# Patient Record
Sex: MALE | Race: WHITE | NOT HISPANIC OR LATINO | Employment: OTHER | URBAN - METROPOLITAN AREA
[De-identification: names, ages, dates, MRNs, and addresses within clinical notes are randomized per-mention and may not be internally consistent; named-entity substitution may affect disease eponyms.]

---

## 2019-05-09 ENCOUNTER — DOCUMENTATION (OUTPATIENT)
Dept: CARDIOLOGY | Facility: CLINIC | Age: 64
End: 2019-05-09

## 2019-05-09 ENCOUNTER — TELEPHONE (OUTPATIENT)
Dept: CARDIOLOGY | Facility: CLINIC | Age: 64
End: 2019-05-09

## 2019-05-09 DIAGNOSIS — Z95.2 HX OF AORTIC VALVE REPLACEMENT, MECHANICAL: ICD-10-CM

## 2019-05-09 DIAGNOSIS — Z79.01 LONG TERM (CURRENT) USE OF ANTICOAGULANTS: ICD-10-CM

## 2019-05-09 LAB — INR PPP: 2.9 (ref 2.5–3.5)

## 2019-05-10 PROBLEM — Z79.01 LONG TERM (CURRENT) USE OF ANTICOAGULANTS: Status: ACTIVE | Noted: 2019-05-10

## 2019-05-10 PROBLEM — Z95.2 HX OF AORTIC VALVE REPLACEMENT, MECHANICAL: Status: ACTIVE | Noted: 2019-05-10

## 2019-06-05 ENCOUNTER — OFFICE VISIT (OUTPATIENT)
Dept: CARDIOLOGY | Facility: CLINIC | Age: 64
End: 2019-06-05

## 2019-06-05 ENCOUNTER — ANTICOAGULATION VISIT (OUTPATIENT)
Dept: CARDIOLOGY | Facility: CLINIC | Age: 64
End: 2019-06-05

## 2019-06-05 VITALS
SYSTOLIC BLOOD PRESSURE: 104 MMHG | HEIGHT: 69 IN | HEART RATE: 63 BPM | DIASTOLIC BLOOD PRESSURE: 64 MMHG | BODY MASS INDEX: 34.36 KG/M2 | WEIGHT: 232 LBS

## 2019-06-05 DIAGNOSIS — Z79.01 LONG TERM (CURRENT) USE OF ANTICOAGULANTS: ICD-10-CM

## 2019-06-05 DIAGNOSIS — Z95.2 HX OF AORTIC VALVE REPLACEMENT, MECHANICAL: Primary | ICD-10-CM

## 2019-06-05 DIAGNOSIS — I10 ESSENTIAL HYPERTENSION: ICD-10-CM

## 2019-06-05 DIAGNOSIS — E78.5 DYSLIPIDEMIA: ICD-10-CM

## 2019-06-05 DIAGNOSIS — Z95.2 HX OF AORTIC VALVE REPLACEMENT, MECHANICAL: ICD-10-CM

## 2019-06-05 DIAGNOSIS — Z95.828 HX OF ASCENDING AORTA REPLACEMENT: ICD-10-CM

## 2019-06-05 PROBLEM — Z87.74 HISTORY OF BICUSPID AORTIC VALVE: Status: ACTIVE | Noted: 2019-06-05

## 2019-06-05 PROBLEM — E11.9 DM (DIABETES MELLITUS), TYPE 2 (HCC): Status: ACTIVE | Noted: 2019-06-05

## 2019-06-05 LAB — INR PPP: 3 (ref 2.5–3.5)

## 2019-06-05 PROCEDURE — 85610 PROTHROMBIN TIME: CPT | Performed by: INTERNAL MEDICINE

## 2019-06-05 PROCEDURE — 99214 OFFICE O/P EST MOD 30 MIN: CPT | Performed by: INTERNAL MEDICINE

## 2019-06-05 PROCEDURE — 36416 COLLJ CAPILLARY BLOOD SPEC: CPT | Performed by: INTERNAL MEDICINE

## 2019-06-05 RX ORDER — IRBESARTAN 75 MG/1
75 TABLET ORAL NIGHTLY
Qty: 90 TABLET | Refills: 2 | Status: SHIPPED | OUTPATIENT
Start: 2019-06-05 | End: 2020-02-10 | Stop reason: SDUPTHER

## 2019-06-05 RX ORDER — IRBESARTAN 75 MG/1
75 TABLET ORAL NIGHTLY
COMMUNITY
End: 2019-06-05 | Stop reason: SDUPTHER

## 2019-06-05 RX ORDER — METOPROLOL SUCCINATE 25 MG/1
25 TABLET, EXTENDED RELEASE ORAL DAILY
Qty: 90 TABLET | Refills: 2 | Status: SHIPPED | OUTPATIENT
Start: 2019-06-05 | End: 2020-02-10 | Stop reason: SDUPTHER

## 2019-06-05 RX ORDER — METOPROLOL SUCCINATE 25 MG/1
25 TABLET, EXTENDED RELEASE ORAL DAILY
COMMUNITY
End: 2019-06-05 | Stop reason: SDUPTHER

## 2019-06-05 NOTE — PROGRESS NOTES
[facility]        Subjective:     Encounter Date:06/05/2019      Patient ID: Bravo Jorge is a 64 y.o. male.    Chief Complaint:  Chief Complaint   Patient presents with   • Cardiac Valve Problem       HPI:  I the pleasure seeing Mr. Marie in the office today.  He is a very pleasant 64-year-old gentleman with a prior history of a bicuspid aortic valve.  He underwent aortic valve surgery and 1995.  He had a [employer] mechanical valve.  He had preop surgery in December 2009 with replacing of the ascending aorta and aortic valve.  He also has a history of hypertension as well as a right bundle branch block and left anterior fascicular block.  He has a history of dyslipidemia and diabetes mellitus.  He had a CT scan in 2016 which demonstrated no evidence of ascending aneurysm.  Bravo is not complain of chest discomfort or palpitations.  He denies shortness of air, orthopnea or paroxysmal nocturnal dyspnea.  He has no lower extremity edema.  Plan he states he did have a recent hemoglobin A1c obtained which was 9.  His triglycerides were also elevated.  He has begun more strict diet control as well as exercise.  His hemoglobin A1c is going to be rechecked in approximately 3 months.    The following portions of the patient's history were reviewed and updated as appropriate: allergies, current medications, past family history, past medical history, past social history, past surgical history and problem list.    Problem List:  Patient Active Problem List   Diagnosis   • Hx of aortic valve replacement, mechanical [Z95.2]   • Long term (current) use of anticoagulants [Z79.01]   • Hx of ascending aorta replacement   • History of bicuspid aortic valve   • Essential hypertension   • Dyslipidemia   • DM (diabetes mellitus), type 2 (CMS/HCC)       Past Medical History:  Past Medical History:   Diagnosis Date   • Diabetes mellitus (CMS/HCC)    • Hyperlipidemia    • Hypertension    • RBBB        Past Surgical  History:  Past Surgical History:   Procedure Laterality Date   • AORTIC ROOT REPLACEMENT     • AORTIC VALVE REPAIR/REPLACEMENT         Social History:  Social History     Socioeconomic History   • Marital status:      Spouse name: Not on file   • Number of children: Not on file   • Years of education: Not on file   • Highest education level: Not on file   Tobacco Use   • Smoking status: Never Smoker   Substance and Sexual Activity   • Alcohol use: Yes     Frequency: 2-4 times a month   • Drug use: Defer   • Sexual activity: Defer       Allergies:  No Known Allergies      ROS:  Review of Systems   Constitution: Negative for chills, decreased appetite, fever, malaise/fatigue, weight gain and weight loss.   HENT: Negative for congestion, hoarse voice, nosebleeds and sore throat.    Eyes: Negative for blurred vision, double vision and visual disturbance.   Cardiovascular: Negative for chest pain, claudication, dyspnea on exertion, irregular heartbeat, leg swelling, near-syncope, orthopnea, palpitations, paroxysmal nocturnal dyspnea and syncope.   Respiratory: Negative for cough, hemoptysis, shortness of breath, sleep disturbances due to breathing, snoring, sputum production and wheezing.    Endocrine: Negative for cold intolerance, heat intolerance, polydipsia and polyuria.   Hematologic/Lymphatic: Negative for adenopathy and bleeding problem. Does not bruise/bleed easily.   Skin: Negative for flushing, itching, nail changes and rash.   Musculoskeletal: Positive for arthritis. Negative for back pain, joint pain, muscle cramps, muscle weakness, myalgias and neck pain.   Gastrointestinal: Positive for heartburn. Negative for bloating, abdominal pain, anorexia, change in bowel habit, constipation, diarrhea, hematemesis, hematochezia, jaundice, melena, nausea and vomiting.   Genitourinary: Negative for dysuria, hematuria and nocturia.   Neurological: Positive for dizziness ( Occasionally when standing). Negative for  "brief paralysis, disturbances in coordination, excessive daytime sleepiness, headaches, light-headedness, loss of balance, numbness, paresthesias, seizures and vertigo.   Psychiatric/Behavioral: Negative for altered mental status and depression. The patient is not nervous/anxious.    Allergic/Immunologic: Negative for environmental allergies and hives.          Objective:         /64   Pulse 63   Ht 174 cm (68.5\")   Wt 105 kg (232 lb)   BMI 34.76 kg/m²     Physical Exam   Constitutional: He is oriented to person, place, and time. He appears well-developed and well-nourished. No distress.   HENT:   Head: Normocephalic and atraumatic.   Mouth/Throat: Oropharynx is clear and moist.   Eyes: Conjunctivae and EOM are normal. Pupils are equal, round, and reactive to light. No scleral icterus.   Neck: Normal range of motion. Neck supple. No thyromegaly present.   Cardiovascular: Normal rate, regular rhythm, S1 normal, S2 normal and intact distal pulses.  No extrasystoles are present. PMI is not displaced. Exam reveals no gallop, no S3, no S4, no friction rub and no decreased pulses.   Murmur ( Crisp mechanical aortic valve sounds.  There is a 2/6 systolic murmur heard at the upper right sternal border and heard throughout the precordium.) heard.  Pulses:       Carotid pulses are 2+ on the right side, and 2+ on the left side.       Dorsalis pedis pulses are 2+ on the right side, and 2+ on the left side.        Posterior tibial pulses are 2+ on the right side, and 2+ on the left side.   Pulmonary/Chest: Effort normal and breath sounds normal. No respiratory distress. He has no wheezes. He has no rales.   Healed sternal scar   Abdominal: Soft. Bowel sounds are normal. He exhibits no distension and no mass. There is no tenderness. There is no rebound and no guarding.   Musculoskeletal: Normal range of motion. He exhibits no edema.   Lymphadenopathy:     He has no cervical adenopathy.   Neurological: He is alert and " oriented to person, place, and time. Coordination normal.   Skin: Skin is warm and dry. No rash noted. He is not diaphoretic. No pallor.   Psychiatric: He has a normal mood and affect. His behavior is normal.   Nursing note and vitals reviewed.      In-Office Procedure(s):  Procedures    ASCVD RIsk Score::  The ASCVD Risk score (L[place]rain SCHWAB Jr., et al., 2013) failed to calculate for the following reasons:    Cannot find a previous HDL lab    Cannot find a previous total cholesterol lab    Recent Radiology:  Imaging Results (most recent)     None          Lab Review:   Documentation on 05/09/2019   Component Date Value   • INR 05/09/2019 2.90                       Assessment:         Problems Addressed this Visit        Cardiovascular and Mediastinum    Essential hypertension    Relevant Medications    irbesartan (AVAPRO) 75 MG tablet    metoprolol succinate XL (TOPROL-XL) 25 MG 24 hr tablet       Other    Hx of aortic valve replacement, mechanical [Z95.2] - Primary    Hx of ascending aorta replacement    Dyslipidemia             Plan:     Bravo is doing well.  I am going to refill his metoprolol and irbesartan.  His INR in the office today is 3.0.  He will need to have a CT scan of his chest repeated for surveillance of his ascending aorta.  He will need to have an echocardiogram on his next visit.               Jelly Florence MD  06/05/19  .

## 2019-07-08 ENCOUNTER — ANTICOAGULATION VISIT (OUTPATIENT)
Dept: CARDIOLOGY | Facility: CLINIC | Age: 64
End: 2019-07-08

## 2019-07-08 DIAGNOSIS — Z95.2 HX OF AORTIC VALVE REPLACEMENT, MECHANICAL: ICD-10-CM

## 2019-07-08 DIAGNOSIS — Z79.01 LONG TERM (CURRENT) USE OF ANTICOAGULANTS: ICD-10-CM

## 2019-07-08 LAB — INR PPP: 2.6 (ref 2.5–3.5)

## 2019-07-08 PROCEDURE — 85610 PROTHROMBIN TIME: CPT | Performed by: INTERNAL MEDICINE

## 2019-07-08 PROCEDURE — 36416 COLLJ CAPILLARY BLOOD SPEC: CPT | Performed by: INTERNAL MEDICINE

## 2019-07-10 ENCOUNTER — TELEPHONE (OUTPATIENT)
Dept: CARDIOLOGY | Facility: CLINIC | Age: 64
End: 2019-07-10

## 2019-07-24 ENCOUNTER — TELEPHONE (OUTPATIENT)
Dept: CARDIOLOGY | Facility: CLINIC | Age: 64
End: 2019-07-24

## 2019-07-24 RX ORDER — WARFARIN SODIUM 5 MG/1
5 TABLET ORAL
Qty: 30 TABLET | Refills: 1 | Status: SHIPPED | OUTPATIENT
Start: 2019-07-24 | End: 2019-09-13 | Stop reason: SDUPTHER

## 2019-07-24 RX ORDER — FENOFIBRATE 160 MG/1
160 TABLET ORAL DAILY
Qty: 90 TABLET | Refills: 3 | Status: SHIPPED | OUTPATIENT
Start: 2019-07-24 | End: 2020-03-26 | Stop reason: SDUPTHER

## 2019-07-24 NOTE — TELEPHONE ENCOUNTER
PT meds in on hold. Fenofibrate at [facility]. DR FIELDS [phone].   Prescription # [id number] Order # [id number]    Warfarin needs 60 day 5mg to [facility]. Waiting for mail order but it will be a couple weeks.

## 2019-08-09 ENCOUNTER — ANTICOAGULATION VISIT (OUTPATIENT)
Dept: CARDIOLOGY | Facility: CLINIC | Age: 64
End: 2019-08-09

## 2019-08-09 DIAGNOSIS — Z79.01 LONG TERM (CURRENT) USE OF ANTICOAGULANTS: ICD-10-CM

## 2019-08-09 DIAGNOSIS — Z95.2 HX OF AORTIC VALVE REPLACEMENT, MECHANICAL: ICD-10-CM

## 2019-08-09 LAB — INR PPP: 2 (ref 2.5–3.5)

## 2019-08-09 PROCEDURE — 36416 COLLJ CAPILLARY BLOOD SPEC: CPT | Performed by: INTERNAL MEDICINE

## 2019-08-09 PROCEDURE — 85610 PROTHROMBIN TIME: CPT | Performed by: INTERNAL MEDICINE

## 2019-08-16 ENCOUNTER — ANTICOAGULATION VISIT (OUTPATIENT)
Dept: CARDIOLOGY | Facility: CLINIC | Age: 64
End: 2019-08-16

## 2019-08-16 DIAGNOSIS — Z79.01 LONG TERM (CURRENT) USE OF ANTICOAGULANTS: ICD-10-CM

## 2019-08-16 DIAGNOSIS — Z95.2 HX OF AORTIC VALVE REPLACEMENT, MECHANICAL: ICD-10-CM

## 2019-08-16 LAB — INR PPP: 2.2 (ref 2.5–3.5)

## 2019-08-16 PROCEDURE — 36416 COLLJ CAPILLARY BLOOD SPEC: CPT | Performed by: INTERNAL MEDICINE

## 2019-08-16 PROCEDURE — 85610 PROTHROMBIN TIME: CPT | Performed by: INTERNAL MEDICINE

## 2019-08-23 ENCOUNTER — ANTICOAGULATION VISIT (OUTPATIENT)
Dept: CARDIOLOGY | Facility: CLINIC | Age: 64
End: 2019-08-23

## 2019-08-23 DIAGNOSIS — Z95.2 HX OF AORTIC VALVE REPLACEMENT, MECHANICAL: ICD-10-CM

## 2019-08-23 DIAGNOSIS — Z79.01 LONG TERM (CURRENT) USE OF ANTICOAGULANTS: ICD-10-CM

## 2019-08-23 LAB — INR PPP: 2.8 (ref 2.5–3.5)

## 2019-08-23 PROCEDURE — 36416 COLLJ CAPILLARY BLOOD SPEC: CPT | Performed by: INTERNAL MEDICINE

## 2019-08-23 PROCEDURE — 85610 PROTHROMBIN TIME: CPT | Performed by: INTERNAL MEDICINE

## 2019-09-13 ENCOUNTER — ANTICOAGULATION VISIT (OUTPATIENT)
Dept: CARDIOLOGY | Facility: CLINIC | Age: 64
End: 2019-09-13

## 2019-09-13 DIAGNOSIS — Z95.2 HX OF AORTIC VALVE REPLACEMENT, MECHANICAL: ICD-10-CM

## 2019-09-13 DIAGNOSIS — Z79.01 LONG TERM (CURRENT) USE OF ANTICOAGULANTS: ICD-10-CM

## 2019-09-13 LAB — INR PPP: 4 (ref 2.5–3.5)

## 2019-09-13 RX ORDER — WARFARIN SODIUM 5 MG/1
5-7.5 TABLET ORAL
Qty: 135 TABLET | Refills: 0 | Status: SHIPPED | OUTPATIENT
Start: 2019-09-13 | End: 2019-12-04 | Stop reason: SDUPTHER

## 2019-09-16 ENCOUNTER — ANTICOAGULATION VISIT (OUTPATIENT)
Dept: CARDIOLOGY | Facility: CLINIC | Age: 64
End: 2019-09-16

## 2019-09-16 DIAGNOSIS — Z95.2 HX OF AORTIC VALVE REPLACEMENT, MECHANICAL: ICD-10-CM

## 2019-09-16 DIAGNOSIS — Z79.01 LONG TERM (CURRENT) USE OF ANTICOAGULANTS: ICD-10-CM

## 2019-09-16 LAB — INR PPP: 2.5 (ref 2.5–3.5)

## 2019-09-16 PROCEDURE — 36416 COLLJ CAPILLARY BLOOD SPEC: CPT | Performed by: INTERNAL MEDICINE

## 2019-09-16 PROCEDURE — 85610 PROTHROMBIN TIME: CPT | Performed by: INTERNAL MEDICINE

## 2019-09-23 LAB — INR PPP: 3.6 (ref 2.5–3.5)

## 2019-09-25 ENCOUNTER — ANTICOAGULATION VISIT (OUTPATIENT)
Dept: CARDIOLOGY | Facility: CLINIC | Age: 64
End: 2019-09-25

## 2019-09-25 DIAGNOSIS — Z79.01 LONG TERM (CURRENT) USE OF ANTICOAGULANTS: ICD-10-CM

## 2019-09-25 DIAGNOSIS — Z95.2 HX OF AORTIC VALVE REPLACEMENT, MECHANICAL: ICD-10-CM

## 2019-09-25 PROCEDURE — 85610 PROTHROMBIN TIME: CPT | Performed by: INTERNAL MEDICINE

## 2019-09-25 PROCEDURE — 36416 COLLJ CAPILLARY BLOOD SPEC: CPT | Performed by: INTERNAL MEDICINE

## 2019-10-07 ENCOUNTER — ANTICOAGULATION VISIT (OUTPATIENT)
Dept: CARDIOLOGY | Facility: CLINIC | Age: 64
End: 2019-10-07

## 2019-10-07 DIAGNOSIS — Z79.01 LONG TERM (CURRENT) USE OF ANTICOAGULANTS: ICD-10-CM

## 2019-10-07 DIAGNOSIS — Z95.2 HX OF AORTIC VALVE REPLACEMENT, MECHANICAL: ICD-10-CM

## 2019-10-07 LAB — INR PPP: 3.1 (ref 2.5–3.5)

## 2019-10-07 PROCEDURE — 36416 COLLJ CAPILLARY BLOOD SPEC: CPT | Performed by: INTERNAL MEDICINE

## 2019-10-07 PROCEDURE — 85610 PROTHROMBIN TIME: CPT | Performed by: INTERNAL MEDICINE

## 2019-11-06 ENCOUNTER — ANTICOAGULATION VISIT (OUTPATIENT)
Dept: CARDIOLOGY | Facility: CLINIC | Age: 64
End: 2019-11-06

## 2019-11-06 DIAGNOSIS — Z95.2 HX OF AORTIC VALVE REPLACEMENT, MECHANICAL: ICD-10-CM

## 2019-11-06 DIAGNOSIS — Z79.01 LONG TERM (CURRENT) USE OF ANTICOAGULANTS: ICD-10-CM

## 2019-11-06 LAB — INR PPP: 2.6 (ref 2.5–3.5)

## 2019-11-06 PROCEDURE — 85610 PROTHROMBIN TIME: CPT | Performed by: INTERNAL MEDICINE

## 2019-11-06 PROCEDURE — 36416 COLLJ CAPILLARY BLOOD SPEC: CPT | Performed by: INTERNAL MEDICINE

## 2019-12-04 RX ORDER — WARFARIN SODIUM 5 MG/1
2.5-5 TABLET ORAL DAILY
Qty: 90 TABLET | Refills: 0 | Status: SHIPPED | OUTPATIENT
Start: 2019-12-04 | End: 2020-03-16

## 2019-12-09 ENCOUNTER — CLINICAL SUPPORT (OUTPATIENT)
Dept: CARDIOLOGY | Facility: CLINIC | Age: 64
End: 2019-12-09

## 2019-12-09 DIAGNOSIS — Z95.2 HX OF AORTIC VALVE REPLACEMENT, MECHANICAL: ICD-10-CM

## 2019-12-09 DIAGNOSIS — Z79.01 LONG TERM (CURRENT) USE OF ANTICOAGULANTS: ICD-10-CM

## 2019-12-09 LAB — INR PPP: 2.9

## 2019-12-09 PROCEDURE — 85610 PROTHROMBIN TIME: CPT | Performed by: INTERNAL MEDICINE

## 2019-12-09 PROCEDURE — 36416 COLLJ CAPILLARY BLOOD SPEC: CPT | Performed by: INTERNAL MEDICINE

## 2020-01-03 ENCOUNTER — CLINICAL SUPPORT (OUTPATIENT)
Dept: CARDIOLOGY | Facility: CLINIC | Age: 65
End: 2020-01-03

## 2020-01-03 DIAGNOSIS — Z79.01 LONG TERM (CURRENT) USE OF ANTICOAGULANTS: ICD-10-CM

## 2020-01-03 DIAGNOSIS — Z95.2 HX OF AORTIC VALVE REPLACEMENT, MECHANICAL: ICD-10-CM

## 2020-01-03 LAB — INR PPP: 2.6 (ref 2.5–3.5)

## 2020-01-03 PROCEDURE — 36416 COLLJ CAPILLARY BLOOD SPEC: CPT | Performed by: INTERNAL MEDICINE

## 2020-01-03 PROCEDURE — 85610 PROTHROMBIN TIME: CPT | Performed by: INTERNAL MEDICINE

## 2020-01-16 ENCOUNTER — OFFICE VISIT (OUTPATIENT)
Dept: CARDIOLOGY | Facility: CLINIC | Age: 65
End: 2020-01-16

## 2020-01-16 VITALS
DIASTOLIC BLOOD PRESSURE: 78 MMHG | SYSTOLIC BLOOD PRESSURE: 130 MMHG | WEIGHT: 232 LBS | HEIGHT: 69 IN | HEART RATE: 62 BPM | BODY MASS INDEX: 34.36 KG/M2

## 2020-01-16 DIAGNOSIS — Z87.74 HISTORY OF BICUSPID AORTIC VALVE: ICD-10-CM

## 2020-01-16 DIAGNOSIS — R06.83 SNORING: ICD-10-CM

## 2020-01-16 DIAGNOSIS — I10 ESSENTIAL HYPERTENSION: ICD-10-CM

## 2020-01-16 DIAGNOSIS — Z95.2 HX OF AORTIC VALVE REPLACEMENT, MECHANICAL: Primary | ICD-10-CM

## 2020-01-16 DIAGNOSIS — Z95.828 HX OF ASCENDING AORTA REPLACEMENT: ICD-10-CM

## 2020-01-16 PROCEDURE — 99213 OFFICE O/P EST LOW 20 MIN: CPT | Performed by: INTERNAL MEDICINE

## 2020-01-16 NOTE — PROGRESS NOTES
Subjective:     Encounter Date:01/16/2020      Patient ID: Bravo Jorge is a 64 y.o. male.    Chief Complaint:  Chief Complaint   Patient presents with   • Cardiac Valve Problem       HPI:  History of Present Illness  I had the pleasure of seeing Bravo in the office today.  He is a very pleasant 64-year-old gentleman with a history of a bicuspid aortic valve.  He underwent aortic valve surgery in 1995 and had a [employer] mechanical valve placed.  In December 2009 he had replacement of the ascending aorta and aortic valve.  Bravo also has a history of hypertension and a right bundle branch block with left anterior fascicular block.  He has a history of dyslipidemia and diabetes mellitus.    Bravo is in the office today for continued care regarding his valvular heart disease.  His initial blood pressure was mildly elevated.  I did recheck it and it had decreased to 130/78 (which is still mildly elevated).  He states he does monitor his blood pressure at home and it is generally in the 120 systolic range.  Bravo is not complaining of chest discomfort or shortness of air.  He denies palpitations, dizziness or near syncope..  He denies lower extremity edema, orthopnea or paroxysmal nocturnal dyspnea.    The following portions of the patient's history were reviewed and updated as appropriate: allergies, current medications, past family history, past medical history, past social history, past surgical history and problem list.    Problem List:  Patient Active Problem List   Diagnosis   • Hx of aortic valve replacement, mechanical [Z95.2]   • Long term (current) use of anticoagulants [Z79.01]   • Hx of ascending aorta replacement   • History of bicuspid aortic valve   • Essential hypertension   • Dyslipidemia   • DM (diabetes mellitus), type 2 (CMS/HCC)   • History of echocardiogram   • Snoring       Past Medical History:  Past Medical History:   Diagnosis Date   • Diabetes mellitus (CMS/HCC)    • History of  echocardiogram     10/4/18 - EF 50-55%. Moderate concentric LVH. Moderately dilated LA. Mild AR & TR. Mild MR, MV is mildly thickened.   • Hyperlipidemia    • Hypertension    • RBBB        Past Surgical History:  Past Surgical History:   Procedure Laterality Date   • AORTIC ROOT REPLACEMENT     • AORTIC VALVE REPAIR/REPLACEMENT         Social History:  Social History     Socioeconomic History   • Marital status:      Spouse name: Not on file   • Number of children: Not on file   • Years of education: Not on file   • Highest education level: Not on file   Tobacco Use   • Smoking status: Never Smoker   Substance and Sexual Activity   • Alcohol use: Yes     Frequency: 2-4 times a month   • Drug use: Defer   • Sexual activity: Defer       Allergies:  No Known Allergies    Immunizations:    There is no immunization history on file for this patient.    ROS:  Review of Systems   Constitution: Negative for chills, decreased appetite, fever, malaise/fatigue, weight gain and weight loss.   HENT: Negative for congestion, hoarse voice, nosebleeds and sore throat.    Eyes: Negative for blurred vision, double vision and visual disturbance.   Cardiovascular: Negative for chest pain, claudication, dyspnea on exertion, irregular heartbeat, leg swelling, near-syncope, orthopnea, palpitations, paroxysmal nocturnal dyspnea and syncope.   Respiratory: Negative for cough, hemoptysis, shortness of breath, sleep disturbances due to breathing, snoring, sputum production and wheezing.    Endocrine: Negative for cold intolerance, heat intolerance, polydipsia and polyuria.   Hematologic/Lymphatic: Negative for adenopathy and bleeding problem. Does not bruise/bleed easily.   Skin: Negative for flushing, itching, nail changes and rash.   Musculoskeletal: Negative for arthritis, back pain, joint pain, muscle cramps, muscle weakness, myalgias and neck pain.   Gastrointestinal: Negative for bloating, abdominal pain, anorexia, change in bowel  "habit, constipation, diarrhea, heartburn, hematemesis, hematochezia, jaundice, melena, nausea and vomiting.   Genitourinary: Negative for dysuria, hematuria and nocturia.   Neurological: Negative for brief paralysis, disturbances in coordination, excessive daytime sleepiness, dizziness, headaches, light-headedness, loss of balance, numbness, paresthesias, seizures and vertigo.   Psychiatric/Behavioral: Negative for altered mental status and depression. The patient is not nervous/anxious.    Allergic/Immunologic: Negative for environmental allergies and hives.          Objective:         /78   Pulse 62   Ht 174 cm (68.5\")   Wt 105 kg (232 lb)   BMI 34.76 kg/m²     Physical Exam   Constitutional: He is oriented to person, place, and time. He appears well-developed and well-nourished. No distress.   HENT:   Head: Normocephalic and atraumatic.   Mouth/Throat: Oropharynx is clear and moist.   Eyes: Pupils are equal, round, and reactive to light. Conjunctivae and EOM are normal. No scleral icterus.   Neck: Normal range of motion. Neck supple. No thyromegaly present.   Cardiovascular: Normal rate, regular rhythm, S1 normal, S2 normal and intact distal pulses.  No extrasystoles are present. PMI is not displaced. Exam reveals no gallop, no S3, no S4, no friction rub and no decreased pulses.   Murmur ( There is a 2 out of 6 systolic murmur heard at the upper right sternal border) heard.  Pulses:       Carotid pulses are 2+ on the right side, and 2+ on the left side.       Dorsalis pedis pulses are 2+ on the right side, and 2+ on the left side.        Posterior tibial pulses are 2+ on the right side, and 2+ on the left side.   There are crisp mechanical aortic valve sounds   Pulmonary/Chest: Effort normal and breath sounds normal. No respiratory distress. He has no wheezes. He has no rales.   Healed surgical scar   Abdominal: Soft. Bowel sounds are normal. He exhibits no distension and no mass. There is no " tenderness. There is no rebound and no guarding.   Musculoskeletal: Normal range of motion. He exhibits edema ( There is trivial bilateral lower extremity edema).   Lymphadenopathy:     He has no cervical adenopathy.   Neurological: He is alert and oriented to person, place, and time. Coordination normal.   Skin: Skin is warm and dry. No rash noted. He is not diaphoretic. No pallor.   Psychiatric: He has a normal mood and affect. His behavior is normal.   Nursing note and vitals reviewed.      In-Office Procedure(s):  Procedures    ASCVD RIsk Score::  The ASCVD Risk score ([place] JOSSELIN Jr., et al., 2013) failed to calculate for the following reasons:    Cannot find a previous HDL lab    Cannot find a previous total cholesterol lab    Recent Radiology:  Imaging Results (Most Recent)     None          Lab Review:   not applicable             Assessment:          Diagnosis Plan   1. Hx of aortic valve replacement, mechanical [Z95.2]     2. Hx of ascending aorta replacement     3. History of bicuspid aortic valve     4. Essential hypertension     5. Snoring            Plan:   1.  History of aortic valve replacement/ascending aorta replacement  His valve and aortic valve replaced secondary to a bicuspid aortic valve.  He does need ongoing surveillance of his ascending aorta, as well as his aortic valve.  I will schedule him to have a CT scan of his chest as well as an echocardiogram.    2.  Essential hypertension  I have asked him to monitor his blood pressure daily.  I have emphasized that his blood pressure goal is less than 130/80.    3.  Diabetes mellitus    4.  Dyslipidemia  He is on atorvastatin.  He did have lab work obtained over the past 3 months and I will try to get a copy from Dr. Romero's office    5.  Snoring  Mr. Andrew his wife accompanies him to the office and states that he does snore loudly.  He is never been evaluated for sleep apnea.  I am going to send him to a sleep specialist        Level of  Care:                 Jelly Florence MD  01/16/20  .

## 2020-01-31 ENCOUNTER — HOSPITAL ENCOUNTER (OUTPATIENT)
Dept: CARDIOLOGY | Facility: HOSPITAL | Age: 65
Discharge: HOME | End: 2020-01-31
Admitting: INTERNAL MEDICINE

## 2020-01-31 ENCOUNTER — HOSPITAL ENCOUNTER (OUTPATIENT)
Dept: CT IMAGING | Facility: HOSPITAL | Age: 65
Discharge: HOME | End: 2020-01-31

## 2020-01-31 ENCOUNTER — TELEPHONE (OUTPATIENT)
Dept: CARDIOLOGY | Facility: CLINIC | Age: 65
End: 2020-01-31

## 2020-01-31 ENCOUNTER — CLINICAL SUPPORT (OUTPATIENT)
Dept: CARDIOLOGY | Facility: CLINIC | Age: 65
End: 2020-01-31

## 2020-01-31 VITALS — BODY MASS INDEX: 35.16 KG/M2 | HEIGHT: 68 IN | WEIGHT: 232 LBS

## 2020-01-31 DIAGNOSIS — Z95.2 HX OF AORTIC VALVE REPLACEMENT, MECHANICAL: ICD-10-CM

## 2020-01-31 DIAGNOSIS — Z79.01 LONG TERM (CURRENT) USE OF ANTICOAGULANTS: ICD-10-CM

## 2020-01-31 LAB
CREAT BLDA-MCNC: 1.5 MG/DL (ref 0.6–1.3)
INR PPP: 2.4 (ref 2.5–3.5)

## 2020-01-31 PROCEDURE — 85610 PROTHROMBIN TIME: CPT | Performed by: INTERNAL MEDICINE

## 2020-01-31 PROCEDURE — 36416 COLLJ CAPILLARY BLOOD SPEC: CPT | Performed by: INTERNAL MEDICINE

## 2020-01-31 PROCEDURE — 93306 TTE W/DOPPLER COMPLETE: CPT | Performed by: INTERNAL MEDICINE

## 2020-01-31 PROCEDURE — 0 IOPAMIDOL PER 1 ML: Performed by: INTERNAL MEDICINE

## 2020-01-31 PROCEDURE — 82565 ASSAY OF CREATININE: CPT

## 2020-01-31 PROCEDURE — 71275 CT ANGIOGRAPHY CHEST: CPT

## 2020-01-31 PROCEDURE — 93306 TTE W/DOPPLER COMPLETE: CPT

## 2020-01-31 RX ADMIN — IOPAMIDOL 95 ML: 755 INJECTION, SOLUTION INTRAVENOUS at 13:42

## 2020-01-31 NOTE — TELEPHONE ENCOUNTER
PT HAD ECHO TODAY 1/31/20. ADELINE THE ECHO TECH CALLED TO GIVE THE EF OF 40-45, PREVIOUS ECHO EF 50-55. I SENT DR BARRY A TEXT MESSAGE WHICH SHE REPLIED OK.

## 2020-02-01 LAB
AORTIC DIMENSIONLESS INDEX: 0.3 (DI)
BH CV ECHO MEAS - ACS: 2 CM
BH CV ECHO MEAS - AO MAX PG (FULL): 21.6 MMHG
BH CV ECHO MEAS - AO MAX PG: 23.4 MMHG
BH CV ECHO MEAS - AO MEAN PG (FULL): 11 MMHG
BH CV ECHO MEAS - AO MEAN PG: 12 MMHG
BH CV ECHO MEAS - AO ROOT AREA (BSA CORRECTED): 1.5
BH CV ECHO MEAS - AO ROOT AREA: 8.6 CM^2
BH CV ECHO MEAS - AO ROOT DIAM: 3.3 CM
BH CV ECHO MEAS - AO V2 MAX: 242 CM/SEC
BH CV ECHO MEAS - AO V2 MEAN: 162 CM/SEC
BH CV ECHO MEAS - AO V2 VTI: 46.3 CM
BH CV ECHO MEAS - BSA(HAYCOCK): 2.3 M^2
BH CV ECHO MEAS - BSA: 2.2 M^2
BH CV ECHO MEAS - BZI_BMI: 35.3 KILOGRAMS/M^2
BH CV ECHO MEAS - BZI_METRIC_HEIGHT: 172.7 CM
BH CV ECHO MEAS - BZI_METRIC_WEIGHT: 105.2 KG
BH CV ECHO MEAS - EDV(CUBED): 250 ML
BH CV ECHO MEAS - EDV(MOD-SP2): 111 ML
BH CV ECHO MEAS - EDV(MOD-SP4): 141 ML
BH CV ECHO MEAS - EDV(TEICH): 201.2 ML
BH CV ECHO MEAS - EF(CUBED): 55.8 %
BH CV ECHO MEAS - EF(MOD-BP): 45 %
BH CV ECHO MEAS - EF(MOD-SP2): 45 %
BH CV ECHO MEAS - EF(MOD-SP4): 46.8 %
BH CV ECHO MEAS - EF(TEICH): 46.6 %
BH CV ECHO MEAS - ESV(CUBED): 110.6 ML
BH CV ECHO MEAS - ESV(MOD-SP2): 61 ML
BH CV ECHO MEAS - ESV(MOD-SP4): 75 ML
BH CV ECHO MEAS - ESV(TEICH): 107.5 ML
BH CV ECHO MEAS - FS: 23.8 %
BH CV ECHO MEAS - IVS/LVPW: 0.9
BH CV ECHO MEAS - IVSD: 0.9 CM
BH CV ECHO MEAS - LAT PEAK E' VEL: 9.7 CM/SEC
BH CV ECHO MEAS - LV DIASTOLIC VOL/BSA (35-75): 64.8 ML/M^2
BH CV ECHO MEAS - LV MASS(C)D: 251.3 GRAMS
BH CV ECHO MEAS - LV MASS(C)DI: 115.4 GRAMS/M^2
BH CV ECHO MEAS - LV MAX PG: 1.8 MMHG
BH CV ECHO MEAS - LV MEAN PG: 1 MMHG
BH CV ECHO MEAS - LV SYSTOLIC VOL/BSA (12-30): 34.5 ML/M^2
BH CV ECHO MEAS - LV V1 MAX: 68 CM/SEC
BH CV ECHO MEAS - LV V1 MEAN: 43.4 CM/SEC
BH CV ECHO MEAS - LV V1 VTI: 12.5 CM
BH CV ECHO MEAS - LVIDD: 6.3 CM
BH CV ECHO MEAS - LVIDS: 4.8 CM
BH CV ECHO MEAS - LVLD AP2: 7.8 CM
BH CV ECHO MEAS - LVLD AP4: 8.2 CM
BH CV ECHO MEAS - LVLS AP2: 7 CM
BH CV ECHO MEAS - LVLS AP4: 7.5 CM
BH CV ECHO MEAS - LVOT DIAM: 2 CM
BH CV ECHO MEAS - LVPWD: 1 CM
BH CV ECHO MEAS - MED PEAK E' VEL: 7.5 CM/SEC
BH CV ECHO MEAS - MR MAX PG: 23.2 MMHG
BH CV ECHO MEAS - MR MAX VEL: 241 CM/SEC
BH CV ECHO MEAS - MV A DUR: 0.13 SEC
BH CV ECHO MEAS - MV A MAX VEL: 97 CM/SEC
BH CV ECHO MEAS - MV DEC SLOPE: 371 CM/SEC^2
BH CV ECHO MEAS - MV DEC TIME: 164 SEC
BH CV ECHO MEAS - MV E MAX VEL: 74.2 CM/SEC
BH CV ECHO MEAS - MV E/A: 0.76
BH CV ECHO MEAS - MV MAX PG: 4.7 MMHG
BH CV ECHO MEAS - MV MEAN PG: 2 MMHG
BH CV ECHO MEAS - MV P1/2T MAX VEL: 107 CM/SEC
BH CV ECHO MEAS - MV P1/2T: 84.5 MSEC
BH CV ECHO MEAS - MV V2 MAX: 108 CM/SEC
BH CV ECHO MEAS - MV V2 MEAN: 70.2 CM/SEC
BH CV ECHO MEAS - MV V2 VTI: 27.8 CM
BH CV ECHO MEAS - MVA P1/2T LCG: 2.1 CM^2
BH CV ECHO MEAS - MVA(P1/2T): 2.6 CM^2
BH CV ECHO MEAS - PA ACC TIME: 0.11 SEC
BH CV ECHO MEAS - PA MAX PG (FULL): 0.85 MMHG
BH CV ECHO MEAS - PA MAX PG: 3.3 MMHG
BH CV ECHO MEAS - PA PR(ACCEL): 29.1 MMHG
BH CV ECHO MEAS - PA V2 MAX: 90.2 CM/SEC
BH CV ECHO MEAS - PULM A REVS DUR: 0.12 SEC
BH CV ECHO MEAS - PULM A REVS VEL: 27 CM/SEC
BH CV ECHO MEAS - PULM DIAS VEL: 51.4 CM/SEC
BH CV ECHO MEAS - PULM S/D: 0.88
BH CV ECHO MEAS - PULM SYS VEL: 45.2 CM/SEC
BH CV ECHO MEAS - PVA(V,A): 4.2 CM^2
BH CV ECHO MEAS - PVA(V,D): 4.2 CM^2
BH CV ECHO MEAS - RV MAX PG: 2.4 MMHG
BH CV ECHO MEAS - RV MEAN PG: 1 MMHG
BH CV ECHO MEAS - RV V1 MAX: 77.6 CM/SEC
BH CV ECHO MEAS - RV V1 MEAN: 53.9 CM/SEC
BH CV ECHO MEAS - RV V1 VTI: 15.6 CM
BH CV ECHO MEAS - RVOT AREA: 4.9 CM^2
BH CV ECHO MEAS - RVOT DIAM: 2.5 CM
BH CV ECHO MEAS - SI(AO): 181.9 ML/M^2
BH CV ECHO MEAS - SI(CUBED): 64.1 ML/M^2
BH CV ECHO MEAS - SI(MOD-SP2): 23 ML/M^2
BH CV ECHO MEAS - SI(MOD-SP4): 30.3 ML/M^2
BH CV ECHO MEAS - SI(TEICH): 43 ML/M^2
BH CV ECHO MEAS - SV(AO): 396 ML
BH CV ECHO MEAS - SV(CUBED): 139.5 ML
BH CV ECHO MEAS - SV(MOD-SP2): 50 ML
BH CV ECHO MEAS - SV(MOD-SP4): 66 ML
BH CV ECHO MEAS - SV(RVOT): 76.6 ML
BH CV ECHO MEAS - SV(TEICH): 93.7 ML
BH CV ECHO MEAS - TAPSE (>1.6): 2.4 CM
BH CV ECHO MEASUREMENTS AVERAGE E/E' RATIO: 8.63
BH CV XLRA - RV BASE: 3.3 CM
BH CV XLRA - TDI S': 9.8 CM/SEC
LEFT ATRIUM VOLUME INDEX: 27.8 ML/M2
MAXIMAL PREDICTED HEART RATE: 155 BPM
STRESS TARGET HR: 132 BPM

## 2020-02-03 DIAGNOSIS — I20.8 OTHER FORMS OF ANGINA PECTORIS (HCC): Primary | ICD-10-CM

## 2020-02-03 DIAGNOSIS — I42.8 OTHER CARDIOMYOPATHY (HCC): ICD-10-CM

## 2020-02-04 DIAGNOSIS — I51.9 LEFT VENTRICULAR SYSTOLIC DYSFUNCTION: Primary | ICD-10-CM

## 2020-02-07 ENCOUNTER — CLINICAL SUPPORT (OUTPATIENT)
Dept: CARDIOLOGY | Facility: CLINIC | Age: 65
End: 2020-02-07

## 2020-02-07 DIAGNOSIS — Z95.2 HX OF AORTIC VALVE REPLACEMENT, MECHANICAL: ICD-10-CM

## 2020-02-07 DIAGNOSIS — Z79.01 LONG TERM (CURRENT) USE OF ANTICOAGULANTS: ICD-10-CM

## 2020-02-07 LAB — INR PPP: 2.6 (ref 2.5–3.5)

## 2020-02-07 PROCEDURE — 36416 COLLJ CAPILLARY BLOOD SPEC: CPT | Performed by: INTERNAL MEDICINE

## 2020-02-07 PROCEDURE — 85610 PROTHROMBIN TIME: CPT | Performed by: INTERNAL MEDICINE

## 2020-02-10 ENCOUNTER — TELEPHONE (OUTPATIENT)
Dept: CARDIOLOGY | Facility: CLINIC | Age: 65
End: 2020-02-10

## 2020-02-10 DIAGNOSIS — I10 ESSENTIAL HYPERTENSION: ICD-10-CM

## 2020-02-10 DIAGNOSIS — Z95.828 HX OF ASCENDING AORTA REPLACEMENT: ICD-10-CM

## 2020-02-10 RX ORDER — METOPROLOL SUCCINATE 25 MG/1
25 TABLET, EXTENDED RELEASE ORAL DAILY
Qty: 90 TABLET | Refills: 1 | Status: SHIPPED | OUTPATIENT
Start: 2020-02-10 | End: 2020-07-27

## 2020-02-10 RX ORDER — IRBESARTAN 75 MG/1
75 TABLET ORAL NIGHTLY
Qty: 90 TABLET | Refills: 1 | Status: SHIPPED | OUTPATIENT
Start: 2020-02-10 | End: 2020-07-27

## 2020-02-10 NOTE — TELEPHONE ENCOUNTER
PT STATES [facility] [employer] MAIL ORDER NEEDS NEEDS CLARIFICATION AND CONFIRMATION BEFORE THEY WILL REFILL HIS MEDS.    METOPROLOL 25 MG 1 TAB DAILY  IRBESARTAN 75 MG 1 TAB DAILY

## 2020-02-14 ENCOUNTER — TELEPHONE (OUTPATIENT)
Dept: ENDOCRINOLOGY | Age: 65
End: 2020-02-14

## 2020-02-14 NOTE — TELEPHONE ENCOUNTER
RECORDS FOR NEW PT ENDO REFERRAL HAVE BEEN GIVEN TO DR CHAO FOR REVIEW. WAITING FOR APPROVAL FROM DR CHAO TO SCHEDULE. PT REFERRED BY DR LILIBETH CALIX FOR UNCONTROLLED DIABETES. NEW PT RECORDS HAVE BEEN SCANNED INTO PT'S CHART.

## 2020-02-18 ENCOUNTER — TELEPHONE (OUTPATIENT)
Dept: ENDOCRINOLOGY | Age: 65
End: 2020-02-18

## 2020-02-18 NOTE — TELEPHONE ENCOUNTER
FIRST ATTEMPT TO REACH PT TO SCHEDULE NEW PT ENDO APPT W/DR CHAO. NOT ABLE TO REACH PT. V/M FULL. PER DR CHAO LEVEL 2 OK TO SCHEDULE.

## 2020-02-19 ENCOUNTER — TELEPHONE (OUTPATIENT)
Dept: ENDOCRINOLOGY | Age: 65
End: 2020-02-19

## 2020-02-20 ENCOUNTER — HOSPITAL ENCOUNTER (OUTPATIENT)
Dept: NUCLEAR MEDICINE | Facility: HOSPITAL | Age: 65
Discharge: HOME | End: 2020-02-20

## 2020-02-20 ENCOUNTER — APPOINTMENT (OUTPATIENT)
Dept: SLEEP MEDICINE | Facility: HOSPITAL | Age: 65
End: 2020-02-20

## 2020-02-20 DIAGNOSIS — I20.8 OTHER FORMS OF ANGINA PECTORIS (HCC): ICD-10-CM

## 2020-02-20 LAB
BH CV STRESS BP STAGE 1: NORMAL
BH CV STRESS BP STAGE 2: NORMAL
BH CV STRESS DURATION MIN STAGE 1: 3
BH CV STRESS DURATION MIN STAGE 2: 3
BH CV STRESS DURATION SEC STAGE 1: 0
BH CV STRESS DURATION SEC STAGE 2: 0
BH CV STRESS GRADE STAGE 1: 10
BH CV STRESS GRADE STAGE 2: 12
BH CV STRESS HR STAGE 1: 121
BH CV STRESS HR STAGE 2: 143
BH CV STRESS METS STAGE 1: 5
BH CV STRESS METS STAGE 2: 7.5
BH CV STRESS PROTOCOL 1: NORMAL
BH CV STRESS RECOVERY BP: NORMAL MMHG
BH CV STRESS RECOVERY HR: 92 BPM
BH CV STRESS SPEED STAGE 1: 1.7
BH CV STRESS SPEED STAGE 2: 2.5
BH CV STRESS STAGE 1: 1
BH CV STRESS STAGE 2: 2
LV EF NUC BP: 42 %
MAXIMAL PREDICTED HEART RATE: 155 BPM
PERCENT MAX PREDICTED HR: 92.26 %
STRESS BASELINE BP: NORMAL MMHG
STRESS BASELINE HR: 65 BPM
STRESS PERCENT HR: 109 %
STRESS POST ESTIMATED WORKLOAD: 7.1 METS
STRESS POST EXERCISE DUR MIN: 5 MIN
STRESS POST EXERCISE DUR SEC: 1 SEC
STRESS POST PEAK BP: NORMAL MMHG
STRESS POST PEAK HR: 143 BPM
STRESS TARGET HR: 132 BPM

## 2020-02-20 PROCEDURE — 78452 HT MUSCLE IMAGE SPECT MULT: CPT

## 2020-02-20 PROCEDURE — 93017 CV STRESS TEST TRACING ONLY: CPT

## 2020-02-20 PROCEDURE — 78452 HT MUSCLE IMAGE SPECT MULT: CPT | Performed by: INTERNAL MEDICINE

## 2020-02-20 PROCEDURE — 0 TECHNETIUM SESTAMIBI: Performed by: INTERNAL MEDICINE

## 2020-02-20 PROCEDURE — A9500 TC99M SESTAMIBI: HCPCS | Performed by: INTERNAL MEDICINE

## 2020-02-20 PROCEDURE — 93018 CV STRESS TEST I&R ONLY: CPT | Performed by: INTERNAL MEDICINE

## 2020-02-20 RX ADMIN — TECHNETIUM TC 99M SESTAMIBI 1 DOSE: 1 INJECTION INTRAVENOUS at 08:30

## 2020-02-20 RX ADMIN — TECHNETIUM TC 99M SESTAMIBI 1 DOSE: 1 INJECTION INTRAVENOUS at 10:55

## 2020-02-21 ENCOUNTER — TELEPHONE (OUTPATIENT)
Dept: CARDIOLOGY | Facility: CLINIC | Age: 65
End: 2020-02-21

## 2020-02-21 DIAGNOSIS — E11.9 TYPE 2 DIABETES MELLITUS WITHOUT COMPLICATION, UNSPECIFIED WHETHER LONG TERM INSULIN USE (HCC): ICD-10-CM

## 2020-02-21 DIAGNOSIS — Z01.818 PREOPERATIVE TESTING: ICD-10-CM

## 2020-02-21 DIAGNOSIS — R94.39 POSITIVE CARDIAC STRESS TEST: ICD-10-CM

## 2020-02-21 DIAGNOSIS — Z95.2 HX OF AORTIC VALVE REPLACEMENT, MECHANICAL: Primary | ICD-10-CM

## 2020-02-21 DIAGNOSIS — E11.22 TYPE 2 DIABETES MELLITUS WITH CHRONIC KIDNEY DISEASE, WITHOUT LONG-TERM CURRENT USE OF INSULIN, UNSPECIFIED CKD STAGE (HCC): ICD-10-CM

## 2020-02-21 DIAGNOSIS — E11.9 TYPE 2 DIABETES MELLITUS WITHOUT COMPLICATION, WITHOUT LONG-TERM CURRENT USE OF INSULIN (HCC): ICD-10-CM

## 2020-02-21 NOTE — TELEPHONE ENCOUNTER
PATIENT WOULD LIKE TO KNOW IF DR. SALAS WOULD LIKE FOR HIM TO USE LOVENOX? STATES SHE USUALLY WILL PRESCRIBE THIS TO HIM BEFORE HE HAS A CATH.

## 2020-02-21 NOTE — TELEPHONE ENCOUNTER
----- Message from Jelly Florence MD sent at 2/20/2020  4:23 PM EST -----  Let him know that his nuclear was a little bit abnormal in the inferior wall.  His EF by nuclear was 42%.  I would like for him to have a cath just to make sure everything is okay

## 2020-02-23 NOTE — TELEPHONE ENCOUNTER
I generally give him Lovenox.  Let me know the Date and we will schedule the Lovenox and discontinuation of Coumadin appropriately

## 2020-02-24 ENCOUNTER — LAB (OUTPATIENT)
Dept: LAB | Facility: HOSPITAL | Age: 65
End: 2020-02-24

## 2020-02-24 ENCOUNTER — HOSPITAL ENCOUNTER (OUTPATIENT)
Dept: CARDIOLOGY | Facility: HOSPITAL | Age: 65
Discharge: HOME | End: 2020-02-24

## 2020-02-24 DIAGNOSIS — Z95.2 HX OF AORTIC VALVE REPLACEMENT, MECHANICAL: ICD-10-CM

## 2020-02-24 DIAGNOSIS — Z01.818 PREOPERATIVE TESTING: ICD-10-CM

## 2020-02-24 DIAGNOSIS — E11.22 TYPE 2 DIABETES MELLITUS WITH CHRONIC KIDNEY DISEASE, WITHOUT LONG-TERM CURRENT USE OF INSULIN, UNSPECIFIED CKD STAGE (HCC): ICD-10-CM

## 2020-02-24 DIAGNOSIS — E11.9 TYPE 2 DIABETES MELLITUS WITHOUT COMPLICATION, WITHOUT LONG-TERM CURRENT USE OF INSULIN (HCC): ICD-10-CM

## 2020-02-24 LAB
ANION GAP SERPL CALCULATED.3IONS-SCNC: 12.8 MMOL/L (ref 5–15)
APTT PPP: 40.5 SECONDS (ref 22.7–35.4)
BASOPHILS # BLD AUTO: 0.06 10*3/MM3 (ref 0–0.2)
BASOPHILS NFR BLD AUTO: 1.1 % (ref 0–1.5)
BUN BLD-MCNC: 26 MG/DL (ref 8–23)
BUN/CREAT SERPL: 17.4 (ref 7–25)
CALCIUM SPEC-SCNC: 10.4 MG/DL (ref 8.6–10.5)
CHLORIDE SERPL-SCNC: 102 MMOL/L (ref 98–107)
CO2 SERPL-SCNC: 24.2 MMOL/L (ref 22–29)
CREAT BLD-MCNC: 1.49 MG/DL (ref 0.76–1.27)
DEPRECATED RDW RBC AUTO: 42.7 FL (ref 37–54)
EOSINOPHIL # BLD AUTO: 0.15 10*3/MM3 (ref 0–0.4)
EOSINOPHIL NFR BLD AUTO: 2.6 % (ref 0.3–6.2)
ERYTHROCYTE [DISTWIDTH] IN BLOOD BY AUTOMATED COUNT: 13.3 % (ref 12.3–15.4)
GFR SERPL CREATININE-BSD FRML MDRD: 47 ML/MIN/1.73
GLUCOSE BLD-MCNC: 143 MG/DL (ref 65–99)
HCT VFR BLD AUTO: 45.7 % (ref 37.5–51)
HGB BLD-MCNC: 15 G/DL (ref 13–17.7)
IMM GRANULOCYTES # BLD AUTO: 0.05 10*3/MM3 (ref 0–0.05)
IMM GRANULOCYTES NFR BLD AUTO: 0.9 % (ref 0–0.5)
INR PPP: 1.94 (ref 0.9–1.1)
LYMPHOCYTES # BLD AUTO: 1.3 10*3/MM3 (ref 0.7–3.1)
LYMPHOCYTES NFR BLD AUTO: 22.8 % (ref 19.6–45.3)
MCH RBC QN AUTO: 28.7 PG (ref 26.6–33)
MCHC RBC AUTO-ENTMCNC: 32.8 G/DL (ref 31.5–35.7)
MCV RBC AUTO: 87.4 FL (ref 79–97)
MONOCYTES # BLD AUTO: 0.73 10*3/MM3 (ref 0.1–0.9)
MONOCYTES NFR BLD AUTO: 12.8 % (ref 5–12)
NEUTROPHILS # BLD AUTO: 3.4 10*3/MM3 (ref 1.7–7)
NEUTROPHILS NFR BLD AUTO: 59.8 % (ref 42.7–76)
NRBC BLD AUTO-RTO: 0 /100 WBC (ref 0–0.2)
PLATELET # BLD AUTO: 212 10*3/MM3 (ref 140–450)
PMV BLD AUTO: 10.1 FL (ref 6–12)
POTASSIUM BLD-SCNC: 5.1 MMOL/L (ref 3.5–5.2)
PROTHROMBIN TIME: 21.8 SECONDS (ref 11.7–14.2)
RBC # BLD AUTO: 5.23 10*6/MM3 (ref 4.14–5.8)
SODIUM BLD-SCNC: 139 MMOL/L (ref 136–145)
WBC NRBC COR # BLD: 5.69 10*3/MM3 (ref 3.4–10.8)

## 2020-02-24 PROCEDURE — 36415 COLL VENOUS BLD VENIPUNCTURE: CPT

## 2020-02-24 PROCEDURE — 85730 THROMBOPLASTIN TIME PARTIAL: CPT

## 2020-02-24 PROCEDURE — 93010 ELECTROCARDIOGRAM REPORT: CPT | Performed by: INTERNAL MEDICINE

## 2020-02-24 PROCEDURE — 85025 COMPLETE CBC W/AUTO DIFF WBC: CPT

## 2020-02-24 PROCEDURE — 80048 BASIC METABOLIC PNL TOTAL CA: CPT

## 2020-02-24 PROCEDURE — 93005 ELECTROCARDIOGRAM TRACING: CPT | Performed by: INTERNAL MEDICINE

## 2020-02-24 PROCEDURE — 85610 PROTHROMBIN TIME: CPT

## 2020-02-25 ENCOUNTER — HOSPITAL ENCOUNTER (OUTPATIENT)
Facility: HOSPITAL | Age: 65
Setting detail: HOSPITAL OUTPATIENT SURGERY
Discharge: HOME | End: 2020-02-25
Attending: INTERNAL MEDICINE | Admitting: INTERNAL MEDICINE

## 2020-02-25 VITALS
HEIGHT: 68 IN | BODY MASS INDEX: 33.04 KG/M2 | HEART RATE: 60 BPM | DIASTOLIC BLOOD PRESSURE: 62 MMHG | OXYGEN SATURATION: 97 % | SYSTOLIC BLOOD PRESSURE: 109 MMHG | WEIGHT: 218 LBS | TEMPERATURE: 97.4 F | RESPIRATION RATE: 16 BRPM

## 2020-02-25 DIAGNOSIS — R94.39 POSITIVE CARDIAC STRESS TEST: ICD-10-CM

## 2020-02-25 PROBLEM — I42.0 CARDIOMYOPATHY, DILATED (HCC): Status: ACTIVE | Noted: 2020-02-25

## 2020-02-25 LAB
GLUCOSE BLDC GLUCOMTR-MCNC: 151 MG/DL (ref 70–130)
INR PPP: 1.9 (ref 0.8–1.2)
INR PPP: 1.9 (ref 0.9–1.1)
PROTHROMBIN TIME: 22.1 SECONDS
PROTHROMBIN TIME: 22.1 SECONDS (ref 12.8–15.2)

## 2020-02-25 PROCEDURE — 85610 PROTHROMBIN TIME: CPT

## 2020-02-25 PROCEDURE — 99152 MOD SED SAME PHYS/QHP 5/>YRS: CPT | Performed by: INTERNAL MEDICINE

## 2020-02-25 PROCEDURE — 25010000002 HEPARIN (PORCINE) PER 1000 UNITS: Performed by: INTERNAL MEDICINE

## 2020-02-25 PROCEDURE — 99153 MOD SED SAME PHYS/QHP EA: CPT | Performed by: INTERNAL MEDICINE

## 2020-02-25 PROCEDURE — C1894 INTRO/SHEATH, NON-LASER: HCPCS | Performed by: INTERNAL MEDICINE

## 2020-02-25 PROCEDURE — 93454 CORONARY ARTERY ANGIO S&I: CPT | Performed by: INTERNAL MEDICINE

## 2020-02-25 PROCEDURE — C1769 GUIDE WIRE: HCPCS | Performed by: INTERNAL MEDICINE

## 2020-02-25 PROCEDURE — 82962 GLUCOSE BLOOD TEST: CPT

## 2020-02-25 PROCEDURE — 0 IOPAMIDOL PER 1 ML: Performed by: INTERNAL MEDICINE

## 2020-02-25 PROCEDURE — 25010000002 MIDAZOLAM PER 1 MG: Performed by: INTERNAL MEDICINE

## 2020-02-25 PROCEDURE — 25010000002 FENTANYL CITRATE (PF) 100 MCG/2ML SOLUTION: Performed by: INTERNAL MEDICINE

## 2020-02-25 RX ORDER — LIDOCAINE HYDROCHLORIDE 20 MG/ML
INJECTION, SOLUTION INFILTRATION; PERINEURAL AS NEEDED
Status: DISCONTINUED | OUTPATIENT
Start: 2020-02-25 | End: 2020-02-25 | Stop reason: HOSPADM

## 2020-02-25 RX ORDER — FENTANYL CITRATE 50 UG/ML
INJECTION, SOLUTION INTRAMUSCULAR; INTRAVENOUS AS NEEDED
Status: DISCONTINUED | OUTPATIENT
Start: 2020-02-25 | End: 2020-02-25 | Stop reason: HOSPADM

## 2020-02-25 RX ORDER — SODIUM CHLORIDE 0.9 % (FLUSH) 0.9 %
10 SYRINGE (ML) INJECTION AS NEEDED
Status: DISCONTINUED | OUTPATIENT
Start: 2020-02-25 | End: 2020-02-25 | Stop reason: HOSPADM

## 2020-02-25 RX ORDER — LIDOCAINE HYDROCHLORIDE 10 MG/ML
0.1 INJECTION, SOLUTION EPIDURAL; INFILTRATION; INTRACAUDAL; PERINEURAL ONCE AS NEEDED
Status: DISCONTINUED | OUTPATIENT
Start: 2020-02-25 | End: 2020-02-25 | Stop reason: HOSPADM

## 2020-02-25 RX ORDER — SODIUM CHLORIDE 0.9 % (FLUSH) 0.9 %
3 SYRINGE (ML) INJECTION EVERY 12 HOURS SCHEDULED
Status: DISCONTINUED | OUTPATIENT
Start: 2020-02-25 | End: 2020-02-25 | Stop reason: HOSPADM

## 2020-02-25 RX ORDER — MIDAZOLAM HYDROCHLORIDE 1 MG/ML
INJECTION INTRAMUSCULAR; INTRAVENOUS AS NEEDED
Status: DISCONTINUED | OUTPATIENT
Start: 2020-02-25 | End: 2020-02-25 | Stop reason: HOSPADM

## 2020-02-25 RX ORDER — SODIUM CHLORIDE 9 MG/ML
75 INJECTION, SOLUTION INTRAVENOUS CONTINUOUS
Status: DISCONTINUED | OUTPATIENT
Start: 2020-02-25 | End: 2020-02-25 | Stop reason: HOSPADM

## 2020-02-25 RX ADMIN — SODIUM CHLORIDE 75 ML/HR: 9 INJECTION, SOLUTION INTRAVENOUS at 09:24

## 2020-02-25 NOTE — H&P
History and physical  2/25/2020    History of Present Illness   Mr. Andrew is a very pleasant 64-year-old gentleman with a history of a bicuspid aortic valve.  He underwent aortic valve surgery in 1995 and had a [employer] mechanical valve placed.  In December 2009 he had replacement of the ascending aorta and aortic valve.  Bravo also has a history of hypertension and a right bundle branch block with left anterior fascicular block.  He has a history of dyslipidemia and diabetes mellitus.     Bravo is in the office today for continued care regarding his valvular heart disease.  His initial blood pressure was mildly elevated.  I did recheck it and it had decreased to 130/78 (which is still mildly elevated).  He states he does monitor his blood pressure at home and it is generally in the 120 systolic range.  Bravo is not complaining of chest discomfort or shortness of air.  He denies palpitations, dizziness or near syncope..  He denies lower extremity edema, orthopnea or paroxysmal nocturnal dyspnea.    Mr. Andrew underwent nuclear stress test 2/20/2020 which revealed a new cardiomyopathy with left ventricular ejection fraction 42%, mild apical ischemia.  He presents today to undergo coronary angiography.     The following portions of the patient's history were reviewed and updated as appropriate: allergies, current medications, past family history, past medical history, past social history, past surgical history and problem list.     Problem List:      Patient Active Problem List   Diagnosis   • Hx of aortic valve replacement, mechanical [Z95.2]   • Long term (current) use of anticoagulants [Z79.01]   • Hx of ascending aorta replacement   • History of bicuspid aortic valve   • Essential hypertension   • Dyslipidemia   • DM (diabetes mellitus), type 2 (CMS/HCC)   • History of echocardiogram   • Snoring         Past Medical History:  Medical History        Past Medical History:   Diagnosis Date   • Diabetes mellitus  (CMS/[facility])     • History of echocardiogram       10/4/18 - EF 50-55%. Moderate concentric LVH. Moderately dilated LA. Mild AR & TR. Mild MR, MV is mildly thickened.   • Hyperlipidemia     • Hypertension     • RBBB              Past Surgical History:  Surgical History         Past Surgical History:   Procedure Laterality Date   • AORTIC ROOT REPLACEMENT       • AORTIC VALVE REPAIR/REPLACEMENT                Social History:  Social History   Social History            Socioeconomic History   • Marital status:        Spouse name: Not on file   • Number of children: Not on file   • Years of education: Not on file   • Highest education level: Not on file   Tobacco Use   • Smoking status: Never Smoker   Substance and Sexual Activity   • Alcohol use: Yes       Frequency: 2-4 times a month   • Drug use: Defer   • Sexual activity: Defer            Allergies:  No Known Allergies     Immunizations:     There is no immunization history on file for this patient.     ROS:  Review of Systems   Constitution: Negative for chills, decreased appetite, fever, malaise/fatigue, weight gain and weight loss.   HENT: Negative for congestion, hoarse voice, nosebleeds and sore throat.    Eyes: Negative for blurred vision, double vision and visual disturbance.   Cardiovascular: Negative for chest pain, claudication, dyspnea on exertion, irregular heartbeat, leg swelling, near-syncope, orthopnea, palpitations, paroxysmal nocturnal dyspnea and syncope.   Respiratory: Negative for cough, hemoptysis, shortness of breath, sleep disturbances due to breathing, snoring, sputum production and wheezing.    Endocrine: Negative for cold intolerance, heat intolerance, polydipsia and polyuria.   Hematologic/Lymphatic: Negative for adenopathy and bleeding problem. Does not bruise/bleed easily.   Skin: Negative for flushing, itching, nail changes and rash.   Musculoskeletal: Negative for arthritis, back pain, joint pain, muscle cramps, muscle weakness,  "myalgias and neck pain.   Gastrointestinal: Negative for bloating, abdominal pain, anorexia, change in bowel habit, constipation, diarrhea, heartburn, hematemesis, hematochezia, jaundice, melena, nausea and vomiting.   Genitourinary: Negative for dysuria, hematuria and nocturia.   Neurological: Negative for brief paralysis, disturbances in coordination, excessive daytime sleepiness, dizziness, headaches, light-headedness, loss of balance, numbness, paresthesias, seizures and vertigo.   Psychiatric/Behavioral: Negative for altered mental status and depression. The patient is not nervous/anxious.    Allergic/Immunologic: Negative for environmental allergies and hives.            Objective:      Objective          /78   Pulse 62   Ht 174 cm (68.5\")   Wt 105 kg (232 lb)   BMI 34.76 kg/m²      Physical Exam   Constitutional: He is oriented to person, place, and time. He appears well-developed and well-nourished. No distress.   HENT:   Head: Normocephalic and atraumatic.   Mouth/Throat: Oropharynx is clear and moist.   Eyes: Pupils are equal, round, and reactive to light. Conjunctivae and EOM are normal. No scleral icterus.   Neck: Normal range of motion. Neck supple. No thyromegaly present.   Cardiovascular: Normal rate, regular rhythm, S1 normal, S2 normal and intact distal pulses.  No extrasystoles are present. PMI is not displaced. Exam reveals no gallop, no S3, no S4, no friction rub and no decreased pulses.   Murmur ( There is a 2 out of 6 systolic murmur heard at the upper right sternal border) heard.  Pulses:       Carotid pulses are 2+ on the right side, and 2+ on the left side.       Dorsalis pedis pulses are 2+ on the right side, and 2+ on the left side.        Posterior tibial pulses are 2+ on the right side, and 2+ on the left side.   There are crisp mechanical aortic valve sounds   Pulmonary/Chest: Effort normal and breath sounds normal. No respiratory distress. He has no wheezes. He has no rales. "   Healed surgical scar   Abdominal: Soft. Bowel sounds are normal. He exhibits no distension and no mass. There is no tenderness. There is no rebound and no guarding.   Musculoskeletal: Normal range of motion. He exhibits edema ( There is trivial bilateral lower extremity edema).   Lymphadenopathy:     He has no cervical adenopathy.   Neurological: He is alert and oriented to person, place, and time. Coordination normal.   Skin: Skin is warm and dry. No rash noted. He is not diaphoretic. No pallor.   Psychiatric: He has a normal mood and affect. His behavior is normal.   Nursing note and vitals reviewed.        In-Office Procedure(s):  Procedures     ASCVD RIsk Score::  The ASCVD Risk score ([facility] SCHWAB Jr., et al., 2013) failed to calculate for the following reasons:    Cannot find a previous HDL lab    Cannot find a previous total cholesterol lab     Recent Radiology:      Imaging Results (Most Recent)      None             Lab Review:   not applicable                  Assessment:      Assessment            Diagnosis Plan   1. Hx of aortic valve replacement, mechanical [Z95.2]      2. Hx of ascending aorta replacement      3. History of bicuspid aortic valve      4. Essential hypertension      5. Snoring               Plan:   1.  History of aortic valve replacement/ascending aorta replacement  His valve and aortic valve replaced secondary to a bicuspid aortic valve.  He does need ongoing surveillance of his ascending aorta, as well as his aortic valve.  I will schedule him to have a CT scan of his chest as well as an echocardiogram.     2.  Essential hypertension  I have asked him to monitor his blood pressure daily.  I have emphasized that his blood pressure goal is less than 130/80.     3.  Diabetes mellitus     4.  Dyslipidemia  He is on atorvastatin.  He did have lab work obtained over the past 3 months and I will try to get a copy from Dr. Romero's office     5.  Snoring  Mr. Andrew his wife accompanies him to  the office and states that he does snore loudly.  He is never been evaluated for sleep apnea.  I am going to send him to a sleep specialist    6.  New cardiomyopathy, left ventricular ejection fraction 42%  Nuclear stress test 2/20/2020 left-ventricular dysfunction as above, apical ischemia     Mr. Andrew presents today to undergo coronary angiography.  Risk benefits been explained patient understands and is prepared to proceed.  His long-term anticoagulation has been held in preparation

## 2020-02-25 NOTE — DISCHARGE INSTRUCTIONS
[facility]  [address] [place] [address]    Coronary Angiogram (Radial/Ulnar Approach) After Care    Refer to this sheet in the next few weeks. These instructions provide you with information on caring for yourself after your procedure. Your caregiver may also give you more specific instructions. Your treatment has been planned according to current medical practices, but problems sometimes occur. Call your caregiver if you have any problems or questions after your procedure.    Home Care Instructions:  · You may shower the day after the procedure. Remove the bandage (dressing) and gently wash the site with plain soap and water. Gently pat the site dry. You may apply a band aid daily for 2 days if desired.    · Do not apply powder or lotion to the site.  · Do not submerge the affected site in water for 3 to 5 days or until the site is completely healed.   · Do not lift, push or pull anything over 10 pounds for 2 days after your procedure.  · Inspect the site at least twice daily. You may notice some bruising at the site and it may be tender for 1 to 2 weeks.     · Increase your fluid intake for the next 2 days.    · Keep arm elevated for 24 hours. For the remainder of the day, keep your arm in “Pledge of Allegiance” position when up and about.     · You may drive 24 hours after the procedure unless otherwise instructed by your caregiver.  · Do not operate machinery or power tools for 24 hours.  · A responsible adult should be with you for the first 24 hours after you arrive home. Do not make any important legal decisions or sign legal papers for 24 hours.  Do not drink alcohol for 24 hours.    · Metformin or any medications containing Metformin should not be taken for 48 hours after your procedure.      Call Your Doctor if:   · You have unusual pain at the radial/ulnar (wrist) site.  · You have redness, warmth, swelling, or pain at the radial/ulnar (wrist) site.  · You have drainage (other  than a small amount of blood on the dressing).  · You have chills or a fever > 101.  · Your arm becomes pale or dark, cool, tingly, or numb.  · You have heavy bleeding from the site, hold pressure on the site for 20 minutes.  If the bleeding stops, apply a fresh bandage and call your cardiologist.  However, if you continue to have bleeding, call 911.

## 2020-02-28 ENCOUNTER — CLINICAL SUPPORT (OUTPATIENT)
Dept: CARDIOLOGY | Facility: CLINIC | Age: 65
End: 2020-02-28

## 2020-02-28 DIAGNOSIS — Z95.2 HX OF AORTIC VALVE REPLACEMENT, MECHANICAL: ICD-10-CM

## 2020-02-28 DIAGNOSIS — Z79.01 LONG TERM (CURRENT) USE OF ANTICOAGULANTS: ICD-10-CM

## 2020-02-28 LAB — INR PPP: 2.1 (ref 2.5–3.5)

## 2020-02-28 PROCEDURE — 85610 PROTHROMBIN TIME: CPT | Performed by: INTERNAL MEDICINE

## 2020-02-28 PROCEDURE — 36416 COLLJ CAPILLARY BLOOD SPEC: CPT | Performed by: INTERNAL MEDICINE

## 2020-03-09 ENCOUNTER — CLINICAL SUPPORT (OUTPATIENT)
Dept: CARDIOLOGY | Facility: CLINIC | Age: 65
End: 2020-03-09

## 2020-03-09 DIAGNOSIS — Z79.01 LONG TERM (CURRENT) USE OF ANTICOAGULANTS: ICD-10-CM

## 2020-03-09 DIAGNOSIS — Z95.2 HX OF AORTIC VALVE REPLACEMENT, MECHANICAL: ICD-10-CM

## 2020-03-09 LAB — INR PPP: 2.7 (ref 0.9–1.1)

## 2020-03-09 PROCEDURE — 36416 COLLJ CAPILLARY BLOOD SPEC: CPT | Performed by: INTERNAL MEDICINE

## 2020-03-09 PROCEDURE — 85610 PROTHROMBIN TIME: CPT | Performed by: INTERNAL MEDICINE

## 2020-03-16 RX ORDER — WARFARIN SODIUM 5 MG/1
2.5-5 TABLET ORAL DAILY
Qty: 90 TABLET | Refills: 0 | Status: SHIPPED | OUTPATIENT
Start: 2020-03-16 | End: 2020-04-07 | Stop reason: SDUPTHER

## 2020-03-26 RX ORDER — FENOFIBRATE 160 MG/1
160 TABLET ORAL DAILY
Qty: 90 TABLET | Refills: 3 | Status: SHIPPED | OUTPATIENT
Start: 2020-03-26 | End: 2020-08-17 | Stop reason: SDUPTHER

## 2020-04-07 RX ORDER — WARFARIN SODIUM 5 MG/1
TABLET ORAL
Qty: 90 TABLET | Refills: 1 | Status: SHIPPED | OUTPATIENT
Start: 2020-04-07 | End: 2020-05-15 | Stop reason: SDUPTHER

## 2020-04-07 NOTE — TELEPHONE ENCOUNTER
Mr Bravo Jorge is unable to get his Warfarin filled thru [facility] mailservice. Requesting it be faxed in       Fax [phone]      Warfarin (teva) 5 mg  1/2-1 tab daily  90 day supply

## 2020-04-17 ENCOUNTER — ANTICOAGULATION VISIT (OUTPATIENT)
Dept: CARDIOLOGY | Facility: CLINIC | Age: 65
End: 2020-04-17

## 2020-04-17 DIAGNOSIS — Z79.01 LONG TERM (CURRENT) USE OF ANTICOAGULANTS: ICD-10-CM

## 2020-04-17 DIAGNOSIS — Z95.2 HX OF AORTIC VALVE REPLACEMENT, MECHANICAL: ICD-10-CM

## 2020-04-17 LAB — INR PPP: 2 (ref 0.9–1.1)

## 2020-04-17 PROCEDURE — 36416 COLLJ CAPILLARY BLOOD SPEC: CPT | Performed by: NURSE PRACTITIONER

## 2020-04-17 PROCEDURE — 85610 PROTHROMBIN TIME: CPT | Performed by: NURSE PRACTITIONER

## 2020-05-15 RX ORDER — WARFARIN SODIUM 5 MG/1
TABLET ORAL
Qty: 90 TABLET | Refills: 1 | Status: SHIPPED | OUTPATIENT
Start: 2020-05-15 | End: 2020-08-17 | Stop reason: SDUPTHER

## 2020-06-05 ENCOUNTER — CLINICAL SUPPORT (OUTPATIENT)
Dept: CARDIOLOGY | Facility: CLINIC | Age: 65
End: 2020-06-05

## 2020-06-05 DIAGNOSIS — Z95.2 HX OF AORTIC VALVE REPLACEMENT, MECHANICAL: ICD-10-CM

## 2020-06-05 DIAGNOSIS — Z79.01 LONG TERM (CURRENT) USE OF ANTICOAGULANTS: ICD-10-CM

## 2020-06-05 LAB — INR PPP: 3 (ref 2.5–3.5)

## 2020-06-05 PROCEDURE — 36416 COLLJ CAPILLARY BLOOD SPEC: CPT | Performed by: INTERNAL MEDICINE

## 2020-06-05 PROCEDURE — 85610 PROTHROMBIN TIME: CPT | Performed by: INTERNAL MEDICINE

## 2020-07-13 ENCOUNTER — CLINICAL SUPPORT (OUTPATIENT)
Dept: CARDIOLOGY | Facility: CLINIC | Age: 65
End: 2020-07-13

## 2020-07-13 DIAGNOSIS — Z79.01 LONG TERM (CURRENT) USE OF ANTICOAGULANTS: ICD-10-CM

## 2020-07-13 DIAGNOSIS — Z95.2 HX OF AORTIC VALVE REPLACEMENT, MECHANICAL: ICD-10-CM

## 2020-07-13 LAB — INR PPP: 3.4 (ref 0.9–1.1)

## 2020-07-13 PROCEDURE — 36416 COLLJ CAPILLARY BLOOD SPEC: CPT | Performed by: INTERNAL MEDICINE

## 2020-07-13 PROCEDURE — 85610 PROTHROMBIN TIME: CPT | Performed by: INTERNAL MEDICINE

## 2020-07-27 DIAGNOSIS — I10 ESSENTIAL HYPERTENSION: ICD-10-CM

## 2020-07-27 DIAGNOSIS — Z95.828 HX OF ASCENDING AORTA REPLACEMENT: ICD-10-CM

## 2020-07-27 RX ORDER — METOPROLOL SUCCINATE 25 MG/1
25 TABLET, EXTENDED RELEASE ORAL DAILY
Qty: 90 TABLET | Refills: 0 | Status: SHIPPED | OUTPATIENT
Start: 2020-07-27 | End: 2020-08-17 | Stop reason: SDUPTHER

## 2020-07-27 RX ORDER — IRBESARTAN 75 MG/1
75 TABLET ORAL NIGHTLY
Qty: 90 TABLET | Refills: 1 | Status: SHIPPED | OUTPATIENT
Start: 2020-07-27 | End: 2020-08-17 | Stop reason: SDUPTHER

## 2020-08-14 ENCOUNTER — CLINICAL SUPPORT (OUTPATIENT)
Dept: CARDIOLOGY | Facility: CLINIC | Age: 65
End: 2020-08-14

## 2020-08-14 DIAGNOSIS — Z95.2 HX OF AORTIC VALVE REPLACEMENT, MECHANICAL: ICD-10-CM

## 2020-08-14 DIAGNOSIS — Z79.01 LONG TERM (CURRENT) USE OF ANTICOAGULANTS: ICD-10-CM

## 2020-08-14 LAB — INR PPP: 2.4 (ref 0.9–1.1)

## 2020-08-14 PROCEDURE — 36416 COLLJ CAPILLARY BLOOD SPEC: CPT | Performed by: INTERNAL MEDICINE

## 2020-08-14 PROCEDURE — 85610 PROTHROMBIN TIME: CPT | Performed by: INTERNAL MEDICINE

## 2020-08-17 ENCOUNTER — TELEMEDICINE (OUTPATIENT)
Dept: CARDIOLOGY | Facility: CLINIC | Age: 65
End: 2020-08-17

## 2020-08-17 VITALS
HEART RATE: 66 BPM | DIASTOLIC BLOOD PRESSURE: 66 MMHG | HEIGHT: 68 IN | SYSTOLIC BLOOD PRESSURE: 127 MMHG | BODY MASS INDEX: 33.04 KG/M2 | WEIGHT: 218 LBS

## 2020-08-17 DIAGNOSIS — I10 ESSENTIAL HYPERTENSION: ICD-10-CM

## 2020-08-17 DIAGNOSIS — E78.5 DYSLIPIDEMIA: ICD-10-CM

## 2020-08-17 DIAGNOSIS — Z95.828 HX OF ASCENDING AORTA REPLACEMENT: ICD-10-CM

## 2020-08-17 DIAGNOSIS — Z87.74 HISTORY OF BICUSPID AORTIC VALVE: ICD-10-CM

## 2020-08-17 DIAGNOSIS — Z95.2 HX OF AORTIC VALVE REPLACEMENT, MECHANICAL: Primary | ICD-10-CM

## 2020-08-17 DIAGNOSIS — I42.0 CARDIOMYOPATHY, DILATED (HCC): ICD-10-CM

## 2020-08-17 DIAGNOSIS — Z79.01 LONG TERM (CURRENT) USE OF ANTICOAGULANTS: ICD-10-CM

## 2020-08-17 PROCEDURE — 99214 OFFICE O/P EST MOD 30 MIN: CPT | Performed by: INTERNAL MEDICINE

## 2020-08-17 RX ORDER — METOPROLOL SUCCINATE 25 MG/1
25 TABLET, EXTENDED RELEASE ORAL DAILY
Qty: 90 TABLET | Refills: 3 | Status: SHIPPED | OUTPATIENT
Start: 2020-08-17 | End: 2021-11-09

## 2020-08-17 RX ORDER — WARFARIN SODIUM 5 MG/1
TABLET ORAL
Qty: 90 TABLET | Refills: 3 | Status: SHIPPED | OUTPATIENT
Start: 2020-08-17 | End: 2021-03-15 | Stop reason: SDUPTHER

## 2020-08-17 RX ORDER — IRBESARTAN 75 MG/1
75 TABLET ORAL NIGHTLY
Qty: 90 TABLET | Refills: 3 | Status: SHIPPED | OUTPATIENT
Start: 2020-08-17 | End: 2021-10-07

## 2020-08-17 RX ORDER — FENOFIBRATE 160 MG/1
160 TABLET ORAL DAILY
Qty: 90 TABLET | Refills: 3 | Status: SHIPPED | OUTPATIENT
Start: 2020-08-17 | End: 2021-03-15 | Stop reason: SDUPTHER

## 2020-08-17 RX ORDER — ATORVASTATIN CALCIUM 40 MG/1
40 TABLET, FILM COATED ORAL DAILY
Qty: 90 TABLET | Refills: 3 | Status: SHIPPED | OUTPATIENT
Start: 2020-08-17

## 2020-08-17 NOTE — PROGRESS NOTES
Subjective:     Encounter Date:08/17/2020      Patient ID: Bravo Jorge is a 65 y.o. male.    Chief Complaint:  Chief Complaint   Patient presents with   • Cardiac Valve Problem       HPI:  History of Present Illness     You have chosen to receive care through a telehealth visit.  Do you consent to use a video/audio connection for your medical care today? Yes      He is a very pleasant 65-year-old gentleman with a history of a bicuspid aortic valve.  He underwent aortic valve surgery in 1995 and had a [employer] mechanical valve placed.  In December 2009 he had replacement of the ascending aorta and aortic valve.  Bravo also has a history of hypertension and a right bundle branch block with left anterior fascicular block.  He has a history of dyslipidemia and diabetes mellitus.     He underwent cardiac catheterization in February 2020 which demonstrated normal coronary arteries and an ejection fraction of 42%.  He had a CT scan of his chest which demonstrated a stable aorta.  He was also noted to have steatosis of the liver and cholelithiasis.  He is going to discuss this with his primary care provider.  He was noted to have a pericardial effusion.  I have discussed this with both he and his wife.    Bravo is feeling well.  He is not complaining of chest discomfort or shortness of air.  He remains active.  He has not complained of palpitations, dizziness or near syncope.  He does have some lower extremity edema at the end of the day or if he wears socks that are too tight but this resolves when he puts his feet up.  No orthopnea or paroxysmal nocturnal dyspnea.  His blood pressure is well controlled.  He and his wife are practicing social distancing and following CDC guidelines.  He states he had lab work obtained a couple weeks ago at [facility].  We will try to get a copy of this lab work.    The following portions of the patient's history were reviewed and updated as appropriate: allergies, current medications,  past family history, past medical history, past social history, past surgical history and problem list.    Problem List:  Patient Active Problem List   Diagnosis   • Hx of aortic valve replacement, mechanical [Z95.2]   • Long term (current) use of anticoagulants [Z79.01]   • Hx of ascending aorta replacement   • History of bicuspid aortic valve   • Essential hypertension   • Dyslipidemia   • DM (diabetes mellitus), type 2 (CMS/HCC)   • History of echocardiogram   • Snoring   • Positive cardiac stress test   • Cardiomyopathy, dilated (CMS/HCC)       Past Medical History:  Past Medical History:   Diagnosis Date   • Diabetes mellitus (CMS/HCC)    • History of echocardiogram     10/4/18 - EF 50-55%. Moderate concentric LVH. Moderately dilated LA. Mild AR & TR. Mild MR, MV is mildly thickened.   • Hyperlipidemia    • Hypertension    • RBBB    • Rheumatic fever in pediatric patient        Past Surgical History:  Past Surgical History:   Procedure Laterality Date   • AORTIC ROOT REPLACEMENT     • AORTIC VALVE REPAIR/REPLACEMENT     • CARDIAC CATHETERIZATION Left 2/25/2020    Procedure: Cardiac Catheterization/Vascular Study;  Surgeon: Soahm Florence MD;  Location: [facility] CATH INVASIVE LOCATION;  Service: Cardiology;  Laterality: Left;   • CARDIAC CATHETERIZATION N/A 2/25/2020    Procedure: Coronary angiography;  Surgeon: Soham Florence MD;  Location:  [facility] CATH INVASIVE LOCATION;  Service: Cardiology;  Laterality: N/A;   • PROSTATE SURGERY  2010       Social History:  Social History     Socioeconomic History   • Marital status:      Spouse name: Not on file   • Number of children: Not on file   • Years of education: Not on file   • Highest education level: Not on file   Tobacco Use   • Smoking status: Never Smoker   • Smokeless tobacco: Never Used   Substance and Sexual Activity   • Alcohol use: Yes     Frequency: 2-4 times a month     Comment: OCCASSIONALLY   • Drug use: Never   • Sexual activity: Defer  "      Allergies:  No Known Allergies    Immunizations:    There is no immunization history on file for this patient.    ROS:  Review of Systems   Constitution: Negative for chills, decreased appetite, fever, malaise/fatigue, weight gain and weight loss.   HENT: Negative for congestion, hoarse voice, nosebleeds and sore throat.    Eyes: Negative for blurred vision, double vision and visual disturbance.   Cardiovascular: Positive for leg swelling. Negative for chest pain, claudication, dyspnea on exertion, irregular heartbeat, near-syncope, orthopnea, palpitations, paroxysmal nocturnal dyspnea and syncope.   Respiratory: Negative for cough, hemoptysis, shortness of breath, sleep disturbances due to breathing, snoring, sputum production and wheezing.    Endocrine: Negative for cold intolerance, heat intolerance, polydipsia and polyuria.   Hematologic/Lymphatic: Negative for adenopathy and bleeding problem. Does not bruise/bleed easily.   Skin: Negative for flushing, itching, nail changes and rash.   Musculoskeletal: Negative for arthritis, back pain, joint pain, muscle cramps, muscle weakness, myalgias and neck pain.   Gastrointestinal: Negative for bloating, abdominal pain, anorexia, change in bowel habit, constipation, diarrhea, heartburn, hematemesis, hematochezia, jaundice, melena, nausea and vomiting.   Genitourinary: Negative for dysuria, hematuria and nocturia.   Neurological: Negative for brief paralysis, disturbances in coordination, excessive daytime sleepiness, dizziness, headaches, light-headedness, loss of balance, numbness, paresthesias, seizures and vertigo.   Psychiatric/Behavioral: Negative for altered mental status and depression. The patient is not nervous/anxious.    Allergic/Immunologic: Negative for environmental allergies and hives.          Objective:         /66   Pulse 66   Ht 172.7 cm (68\")   Wt 98.9 kg (218 lb)   BMI 33.15 kg/m²     Physical Exam  Physical exam is limited secondary " to video visit.  He was alert and oriented.  No jugular venous distention.  No edema.  In-Office Procedure(s):  Procedures    ASCVD RIsk Score::  The ASCVD Risk score (Leda JOSSELIN Jr., et al., 2013) failed to calculate for the following reasons:    Cannot find a previous HDL lab    Cannot find a previous total cholesterol lab    Recent Radiology:  Imaging Results (Most Recent)     None          Lab Review:   not applicable             Assessment:          Diagnosis Plan   1. History of bicuspid aortic valve     2. Hx of aortic valve replacement, mechanical [Z95.2]     3. Hx of ascending aorta replacement     4. Essential hypertension     5. Cardiomyopathy, dilated (CMS/HCC)     6. Dyslipidemia     7. Long term (current) use of anticoagulants [Z79.01]            Plan:      Overall Bravo is doing very well.  He maintained his INR between 2.5-3.5.  I have refilled his medications.  I will plan on seeing him in the office in approximately 3 months  We have discussed reduction of his ejection fraction and the importance of maintaining blood pressure control.    Length of video visit was 20 minutes  EMR documentation 10 minutes  Level of Care:                 Jelly Florence MD  08/17/20  .

## 2020-08-25 RX ORDER — ICOSAPENT ETHYL 1000 MG/1
2 CAPSULE ORAL 2 TIMES DAILY WITH MEALS
Qty: 120 CAPSULE | Refills: 5 | Status: SHIPPED | OUTPATIENT
Start: 2020-08-25 | End: 2020-11-18

## 2020-08-25 NOTE — TELEPHONE ENCOUNTER
Vascepa sent in for 2g bid. Asked patient if he would be willing to see a Nephrologist due to increased Creatinine & he said he wasn't sure, he will think about it.

## 2020-08-25 NOTE — TELEPHONE ENCOUNTER
Can you call his insurance company.  The Vascepa should not cost this much.  He has mixed hyperlipidemia and that diagnosis should cover his Vascepa

## 2020-08-25 NOTE — TELEPHONE ENCOUNTER
Pt called back stating that the Vascepa is going to cost him almost $400, he can not afford that, is there anything otc he can take. dmk

## 2020-08-31 NOTE — TELEPHONE ENCOUNTER
Spoke with the patient, that is how much it costs with his insurance coverage, per Dr. Florence, can try OTC fish oil, but it's not near as effective as Vascepa, 1000 iu/day

## 2020-10-19 ENCOUNTER — CLINICAL SUPPORT (OUTPATIENT)
Dept: CARDIOLOGY | Facility: CLINIC | Age: 65
End: 2020-10-19

## 2020-10-19 DIAGNOSIS — Z79.01 LONG TERM (CURRENT) USE OF ANTICOAGULANTS: ICD-10-CM

## 2020-10-19 DIAGNOSIS — Z95.2 HX OF AORTIC VALVE REPLACEMENT, MECHANICAL: ICD-10-CM

## 2020-10-19 LAB — INR PPP: 3 (ref 0.9–1.1)

## 2020-10-19 PROCEDURE — 36416 COLLJ CAPILLARY BLOOD SPEC: CPT | Performed by: INTERNAL MEDICINE

## 2020-10-19 PROCEDURE — 85610 PROTHROMBIN TIME: CPT | Performed by: INTERNAL MEDICINE

## 2020-11-18 ENCOUNTER — TELEMEDICINE (OUTPATIENT)
Dept: CARDIOLOGY | Facility: CLINIC | Age: 65
End: 2020-11-18

## 2020-11-18 VITALS
WEIGHT: 215 LBS | HEART RATE: 65 BPM | SYSTOLIC BLOOD PRESSURE: 109 MMHG | BODY MASS INDEX: 32.69 KG/M2 | DIASTOLIC BLOOD PRESSURE: 63 MMHG

## 2020-11-18 DIAGNOSIS — Z79.01 LONG TERM (CURRENT) USE OF ANTICOAGULANTS: ICD-10-CM

## 2020-11-18 DIAGNOSIS — Z95.828 HX OF ASCENDING AORTA REPLACEMENT: ICD-10-CM

## 2020-11-18 DIAGNOSIS — E78.5 DYSLIPIDEMIA: ICD-10-CM

## 2020-11-18 DIAGNOSIS — I42.0 CARDIOMYOPATHY, DILATED (HCC): ICD-10-CM

## 2020-11-18 DIAGNOSIS — I10 ESSENTIAL HYPERTENSION: ICD-10-CM

## 2020-11-18 DIAGNOSIS — Z95.2 HX OF AORTIC VALVE REPLACEMENT, MECHANICAL: Primary | ICD-10-CM

## 2020-11-18 PROCEDURE — 99214 OFFICE O/P EST MOD 30 MIN: CPT | Performed by: INTERNAL MEDICINE

## 2020-11-18 RX ORDER — CHLORAL HYDRATE 500 MG
CAPSULE ORAL 2 TIMES DAILY
COMMUNITY

## 2020-11-18 NOTE — PROGRESS NOTES
Subjective:     Encounter Date:11/18/2020      Patient ID: Bravo Jorge is a 65 y.o. male.    Chief Complaint:  Chief Complaint   Patient presents with   • Follow-up       HPI:  History of Present Illness     You have chosen to receive care through a telehealth visit.  Do you consent to use a video/audio connection for your medical care today? Yes  I had a video visit with Mr. Jorge today. He is a very pleasant 65-year-old gentleman with a history of a bicuspid aortic valve.  He underwent aortic valve surgery in 1995 and had a [employer] mechanical valve placed.  In December 2009 he had replacement of the ascending aorta and aortic valve.  Bravo also has a history of hypertension and a right bundle branch block with left anterior fascicular block.  He has a history of dyslipidemia and diabetes mellitus.      He underwent cardiac catheterization in February 2020 which demonstrated normal coronary arteries and an ejection fraction of 42%.  He had a CT scan of his chest which demonstrated a stable aorta.   He had an echocardiogram in January 2012 demonstrated ejection fraction 45%.  The mean gradient across his aortic valve is 12 mmHg.    Owen is feeling fairly well.  He is not complaining of chest discomfort or shortness of air.  He denies lower extremity edema, orthopnea or paroxysmal nocturnal dyspnea.  He is not experiencing palpitations, dizziness or near syncope.  He has wife are practicing social distancing and has had no known direct exposure to COVID-19.  On his last visit I did prescribe Vascepa however this was cost prohibitive and he is taking over-the-counter fish oil.        The following portions of the patient's history were reviewed and updated as appropriate: allergies, current medications, past family history, past medical history, past social history, past surgical history and problem list.    Problem List:  Patient Active Problem List   Diagnosis   • Hx of aortic valve replacement, mechanical  [Z95.2]   • Long term (current) use of anticoagulants [Z79.01]   • Hx of ascending aorta replacement   • History of bicuspid aortic valve   • Essential hypertension   • Dyslipidemia   • DM (diabetes mellitus), type 2 (CMS/HCC)   • History of echocardiogram   • Snoring   • Positive cardiac stress test   • Cardiomyopathy, dilated (CMS/HCC)       Past Medical History:  Past Medical History:   Diagnosis Date   • Diabetes mellitus (CMS/HCC)    • History of echocardiogram     10/4/18 - EF 50-55%. Moderate concentric LVH. Moderately dilated LA. Mild AR & TR. Mild MR, MV is mildly thickened.   • Hyperlipidemia    • Hypertension    • RBBB    • Rheumatic fever in pediatric patient        Past Surgical History:  Past Surgical History:   Procedure Laterality Date   • AORTIC ROOT REPLACEMENT     • AORTIC VALVE REPAIR/REPLACEMENT     • CARDIAC CATHETERIZATION Left 2/25/2020    Procedure: Cardiac Catheterization/Vascular Study;  Surgeon: Soham Florence MD;  Location: [facility] CATH INVASIVE LOCATION;  Service: Cardiology;  Laterality: Left;   • CARDIAC CATHETERIZATION N/A 2/25/2020    Procedure: Coronary angiography;  Surgeon: Soham Florence MD;  Location:  [facility] CATH INVASIVE LOCATION;  Service: Cardiology;  Laterality: N/A;   • PROSTATE SURGERY  2010       Social History:  Social History     Socioeconomic History   • Marital status:      Spouse name: Not on file   • Number of children: Not on file   • Years of education: Not on file   • Highest education level: Not on file   Tobacco Use   • Smoking status: Never Smoker   • Smokeless tobacco: Never Used   Substance and Sexual Activity   • Alcohol use: Yes     Frequency: 2-4 times a month     Comment: OCCASSIONALLY   • Drug use: Never   • Sexual activity: Defer       Allergies:  No Known Allergies    Immunizations:    There is no immunization history on file for this patient.    ROS:  Review of Systems   Constitution: Negative for chills, decreased appetite, fever,  malaise/fatigue, weight gain and weight loss.   HENT: Negative for congestion, hoarse voice, nosebleeds and sore throat.    Eyes: Negative for blurred vision, double vision and visual disturbance.   Cardiovascular: Negative for chest pain, claudication, dyspnea on exertion, irregular heartbeat, leg swelling, near-syncope, orthopnea, palpitations, paroxysmal nocturnal dyspnea and syncope.   Respiratory: Negative for cough, hemoptysis, shortness of breath, sleep disturbances due to breathing, snoring, sputum production and wheezing.    Endocrine: Negative for cold intolerance, heat intolerance, polydipsia and polyuria.   Hematologic/Lymphatic: Negative for adenopathy and bleeding problem. Does not bruise/bleed easily.   Skin: Negative for flushing, itching, nail changes and rash.   Musculoskeletal: Positive for arthritis. Negative for back pain, joint pain, muscle cramps, muscle weakness, myalgias and neck pain.   Gastrointestinal: Negative for bloating, abdominal pain, anorexia, change in bowel habit, constipation, diarrhea, heartburn, hematemesis, hematochezia, jaundice, melena, nausea and vomiting.   Genitourinary: Negative for dysuria, hematuria and nocturia.   Neurological: Negative for brief paralysis, disturbances in coordination, excessive daytime sleepiness, dizziness, headaches, light-headedness, loss of balance, numbness, paresthesias, seizures and vertigo.   Psychiatric/Behavioral: Negative for altered mental status and depression. The patient is not nervous/anxious.    Allergic/Immunologic: Negative for environmental allergies and hives.          Objective:         /63   Pulse 65   Wt 97.5 kg (215 lb)   BMI 32.69 kg/m²     Physical Exam  Unable to perform physical exam secondary to video visit.  He was alert and oriented and in no acute distress  In-Office Procedure(s):  Procedures    ASCVD RIsk Score::  The ASCVD Risk score (Leda DC Jr., et al., 2013) failed to calculate for the following  reasons:    Cannot find a previous HDL lab    Cannot find a previous total cholesterol lab    Recent Radiology:  Imaging Results (Most Recent)     None          Lab Review:   not applicable             Assessment:          Diagnosis Plan   1. Hx of aortic valve replacement, mechanical [Z95.2]     2. Hx of ascending aorta replacement     3. Long term (current) use of anticoagulants [Z79.01]     4. Dyslipidemia     5. Cardiomyopathy, dilated (CMS/HCC)     6. Essential hypertension            Plan:      Owen is stable from a cardiovascular standpoint.  I have not changed his medications.  He is scheduled to have lab work at the end of December.  I will plan on seeing him in 4 5 months.    Length of video visit was 20 minutes  EMR documentation 10 minutes  Level of Care:                 Jelly Florence MD  11/18/20  .

## 2020-12-01 ENCOUNTER — CLINICAL SUPPORT (OUTPATIENT)
Dept: CARDIOLOGY | Facility: CLINIC | Age: 65
End: 2020-12-01

## 2020-12-01 DIAGNOSIS — Z95.2 HX OF AORTIC VALVE REPLACEMENT, MECHANICAL: ICD-10-CM

## 2020-12-01 DIAGNOSIS — Z79.01 LONG TERM (CURRENT) USE OF ANTICOAGULANTS: ICD-10-CM

## 2020-12-01 LAB — INR PPP: 3.4 (ref 2.5–3.5)

## 2020-12-01 PROCEDURE — 36416 COLLJ CAPILLARY BLOOD SPEC: CPT | Performed by: INTERNAL MEDICINE

## 2020-12-01 PROCEDURE — 85610 PROTHROMBIN TIME: CPT | Performed by: INTERNAL MEDICINE

## 2021-01-05 ENCOUNTER — CLINICAL SUPPORT (OUTPATIENT)
Dept: CARDIOLOGY | Facility: CLINIC | Age: 66
End: 2021-01-05

## 2021-01-05 DIAGNOSIS — Z79.01 LONG TERM (CURRENT) USE OF ANTICOAGULANTS: ICD-10-CM

## 2021-01-05 DIAGNOSIS — Z95.2 HX OF AORTIC VALVE REPLACEMENT, MECHANICAL: ICD-10-CM

## 2021-01-05 LAB — INR PPP: 3.7 (ref 2.5–3.5)

## 2021-01-05 PROCEDURE — 85610 PROTHROMBIN TIME: CPT | Performed by: INTERNAL MEDICINE

## 2021-01-05 PROCEDURE — 36416 COLLJ CAPILLARY BLOOD SPEC: CPT | Performed by: INTERNAL MEDICINE

## 2021-01-27 ENCOUNTER — CLINICAL SUPPORT (OUTPATIENT)
Dept: CARDIOLOGY | Facility: CLINIC | Age: 66
End: 2021-01-27

## 2021-01-27 DIAGNOSIS — Z95.2 HX OF AORTIC VALVE REPLACEMENT, MECHANICAL: ICD-10-CM

## 2021-01-27 DIAGNOSIS — Z79.01 LONG TERM (CURRENT) USE OF ANTICOAGULANTS: ICD-10-CM

## 2021-01-27 LAB — INR PPP: 2.6 (ref 2.5–3.5)

## 2021-01-27 PROCEDURE — 85610 PROTHROMBIN TIME: CPT | Performed by: INTERNAL MEDICINE

## 2021-01-27 PROCEDURE — 36416 COLLJ CAPILLARY BLOOD SPEC: CPT | Performed by: INTERNAL MEDICINE

## 2021-02-09 ENCOUNTER — CLINICAL SUPPORT (OUTPATIENT)
Dept: CARDIOLOGY | Facility: CLINIC | Age: 66
End: 2021-02-09

## 2021-02-09 DIAGNOSIS — Z79.01 LONG TERM (CURRENT) USE OF ANTICOAGULANTS: ICD-10-CM

## 2021-02-09 DIAGNOSIS — Z95.2 HX OF AORTIC VALVE REPLACEMENT, MECHANICAL: ICD-10-CM

## 2021-02-09 LAB — INR PPP: 2.6 (ref 2.5–3.5)

## 2021-02-09 PROCEDURE — 85610 PROTHROMBIN TIME: CPT | Performed by: INTERNAL MEDICINE

## 2021-02-09 PROCEDURE — 36416 COLLJ CAPILLARY BLOOD SPEC: CPT | Performed by: INTERNAL MEDICINE

## 2021-03-04 ENCOUNTER — CLINICAL SUPPORT (OUTPATIENT)
Dept: CARDIOLOGY | Facility: CLINIC | Age: 66
End: 2021-03-04

## 2021-03-04 DIAGNOSIS — Z79.01 LONG TERM (CURRENT) USE OF ANTICOAGULANTS: ICD-10-CM

## 2021-03-04 DIAGNOSIS — Z95.2 HX OF AORTIC VALVE REPLACEMENT, MECHANICAL: ICD-10-CM

## 2021-03-04 PROCEDURE — 36416 COLLJ CAPILLARY BLOOD SPEC: CPT | Performed by: INTERNAL MEDICINE

## 2021-03-04 PROCEDURE — 85610 PROTHROMBIN TIME: CPT | Performed by: INTERNAL MEDICINE

## 2021-03-09 LAB — INR PPP: 2 (ref 0.9–1.1)

## 2021-03-13 PROBLEM — I42.0 CARDIOMYOPATHY, DILATED (HCC): Chronic | Status: ACTIVE | Noted: 2020-02-25

## 2021-03-13 PROBLEM — I10 ESSENTIAL HYPERTENSION: Chronic | Status: ACTIVE | Noted: 2019-06-05

## 2021-03-13 PROBLEM — Z95.2 HX OF AORTIC VALVE REPLACEMENT, MECHANICAL: Chronic | Status: ACTIVE | Noted: 2019-05-10

## 2021-03-13 PROBLEM — Z87.74 HISTORY OF BICUSPID AORTIC VALVE: Chronic | Status: ACTIVE | Noted: 2019-06-05

## 2021-03-13 PROBLEM — E11.9 DM (DIABETES MELLITUS), TYPE 2: Chronic | Status: ACTIVE | Noted: 2019-06-05

## 2021-03-13 PROBLEM — Z98.890 HISTORY OF CARDIAC CATHETERIZATION: Status: ACTIVE | Noted: 2021-03-13

## 2021-03-13 PROBLEM — E78.5 DYSLIPIDEMIA: Chronic | Status: ACTIVE | Noted: 2019-06-05

## 2021-03-13 PROBLEM — Z95.828 HX OF ASCENDING AORTA REPLACEMENT: Chronic | Status: ACTIVE | Noted: 2019-06-05

## 2021-03-13 PROBLEM — Z79.01 LONG TERM (CURRENT) USE OF ANTICOAGULANTS: Chronic | Status: ACTIVE | Noted: 2019-05-10

## 2021-03-13 NOTE — PROGRESS NOTES
"Chief Complaint  Follow-up    Subjective    History of Present Illness      I had the pleasure of seeing Mr. Jorge in the office. He is a very pleasant 66-year-old gentleman with a history of a bicuspid aortic valve.  He underwent aortic valve surgery in 1995 and had a [employer] mechanical valve placed.  In December 2009 he had replacement of the ascending aorta and aortic valve.  Bravo also has a history of hypertension and a right bundle branch block with left anterior fascicular block.  He has a history of dyslipidemia and diabetes mellitus.    He underwent cardiac catheterization in February 2020 which demonstrated normal coronary arteries and an ejection fraction of 42%.  He had a CT scan of his chest which demonstrated a stable aorta.   He had an echocardiogram in January 2012 demonstrated ejection fraction 45%.  The mean gradient across his aortic valve is 12 mmHg.    Bravo states that he is feeling well.  He is not complaining of chest discomfort, shortness of air, orthopnea or paroxysmal nocturnal dyspnea.  No lower extremity edema.  No palpitations, dizziness or near syncope.  His wife states that his blood pressure has been well controlled at home.  He is compliant with his medications.               Objective   Vital Signs:   /66 (BP Location: Left arm, Patient Position: Sitting)   Pulse 91   Resp 18   Ht 172.7 cm (68\")   Wt 104 kg (228 lb 3.2 oz)   BMI 34.70 kg/m²     Vitals and nursing note reviewed.   Constitutional:       General: Not in acute distress.     Appearance: Healthy appearance. Well-developed. Not diaphoretic.   Eyes:      General: No scleral icterus.     Conjunctiva/sclera: Conjunctivae normal.      Pupils: Pupils are equal, round, and reactive to light.   HENT:      Head: Normocephalic and atraumatic.   Neck:      Thyroid: Thyroid normal. No thyromegaly.      Vascular: JVD normal.      Lymphadenopathy: No cervical adenopathy.   Pulmonary:      Effort: Pulmonary effort is " normal. No respiratory distress.      Breath sounds: Normal breath sounds. No wheezing. No rales.   Cardiovascular:      PMI at left midclavicular line. Normal rate. Regular rhythm. Normal S1. Normal S2.      Murmurs:  There is a 1/6 systolic murmur heard at the upper right sternal border      No gallop. No S3 and S4 gallop. No click. No rub.      Comments: Crisp mechanical aortic valve sounds  Pulses:     Intact distal pulses. No decreased pulses.   Edema:     Peripheral edema absent.   Abdominal:      General: Bowel sounds are normal. There is no distension.      Palpations: Abdomen is soft. There is no abdominal mass.      Tenderness: There is no abdominal tenderness. There is no guarding or rebound.   Musculoskeletal: Normal range of motion.      Cervical back: Normal range of motion and neck supple. Skin:     General: Skin is warm and dry.      Coloration: Skin is not pale.      Findings: No rash.   Neurological:      Mental Status: Alert and oriented to person, place, and time.      Coordination: Coordination normal.      Gait: Gait is intact.   Psychiatric:         Behavior: Behavior normal.           Result Review :   The following data was reviewed by: Jelly Florence MD on 03/15/2021:    Lab in August 2020: Total cholesterol 138, HDL 28, LDL 76, glycerides 262.  Glucose 143, BUN 28 and creatinine 1.48.  Potassium 5.2.  Hemoglobin A1c 7.5%.  Hemoglobin 14.4                 Assessment and Plan    1. Hx of aortic valve replacement, mechanical [Z95.2]  1995, aortic valve replacement secondary to a bicuspid aortic valve  2010: Replacement of the aortic valve with an aortic valve conduit with a #25 [employer] mechanical medical valve and replacement of ascending aorta.  Stable.  On exam his mechanical aortic valve sounds are crisp.  He remains on warfarin and aspirin.    2. Hx of ascending aorta replacement  2010: Replacement of ascending aorta  CT scan on January 31, 2020 demonstrated aortic root 3.2 cm,  ascending aorta 3.3 cm.  Distal aortic arch 3 cm mid ascending thoracic aorta 3.6 cm.    3. Essential hypertension  Controlled    4. Cardiomyopathy, dilated (CMS/HCC)  His ejection fraction was 45% by echocardiogram in January 2020.  I will repeat his study this year.    He is on irbesartan and metoprolol.  No symptoms of congestive heart failure    6. Dyslipidemia  Profile from August 2020:  Total cholesterol 138, HDL 28, LDL 76, glycerides 262.   He is on atorvastatin 40 mg daily, fenofibrate 160 mg daily and fish oil 1000 mg daily.  I will repeat his lipid profile.  I did prescribe Vascepa on his last visit but it was cost prohibitive    7. Long term (current) use of anticoagulants [Z79.01]  On long-term warfarin        Follow Up   No follow-ups on file.  Patient was given instructions and counseling regarding his condition or for health maintenance advice. Please see specific information pulled into the AVS if appropriate.

## 2021-03-15 ENCOUNTER — OFFICE VISIT (OUTPATIENT)
Dept: CARDIOLOGY | Facility: CLINIC | Age: 66
End: 2021-03-15

## 2021-03-15 VITALS
HEIGHT: 68 IN | DIASTOLIC BLOOD PRESSURE: 66 MMHG | SYSTOLIC BLOOD PRESSURE: 136 MMHG | BODY MASS INDEX: 34.59 KG/M2 | RESPIRATION RATE: 18 BRPM | WEIGHT: 228.2 LBS | HEART RATE: 91 BPM

## 2021-03-15 DIAGNOSIS — Z95.828 HX OF ASCENDING AORTA REPLACEMENT: Chronic | ICD-10-CM

## 2021-03-15 DIAGNOSIS — I42.0 CARDIOMYOPATHY, DILATED (HCC): Chronic | ICD-10-CM

## 2021-03-15 DIAGNOSIS — Z95.2 HX OF AORTIC VALVE REPLACEMENT, MECHANICAL: Primary | Chronic | ICD-10-CM

## 2021-03-15 DIAGNOSIS — Z79.01 LONG TERM (CURRENT) USE OF ANTICOAGULANTS: Chronic | ICD-10-CM

## 2021-03-15 DIAGNOSIS — I10 ESSENTIAL HYPERTENSION: Chronic | ICD-10-CM

## 2021-03-15 DIAGNOSIS — E78.5 DYSLIPIDEMIA: Chronic | ICD-10-CM

## 2021-03-15 DIAGNOSIS — Z87.74 HISTORY OF BICUSPID AORTIC VALVE: Chronic | ICD-10-CM

## 2021-03-15 LAB — INR PPP: 2.8 (ref 2.5–3.5)

## 2021-03-15 PROCEDURE — 85610 PROTHROMBIN TIME: CPT | Performed by: INTERNAL MEDICINE

## 2021-03-15 PROCEDURE — 99214 OFFICE O/P EST MOD 30 MIN: CPT | Performed by: INTERNAL MEDICINE

## 2021-03-15 PROCEDURE — 36416 COLLJ CAPILLARY BLOOD SPEC: CPT | Performed by: INTERNAL MEDICINE

## 2021-03-15 RX ORDER — WARFARIN SODIUM 5 MG/1
TABLET ORAL
Qty: 90 TABLET | Refills: 3 | Status: SHIPPED | OUTPATIENT
Start: 2021-03-15 | End: 2021-04-19

## 2021-03-15 RX ORDER — FENOFIBRATE 160 MG/1
160 TABLET ORAL DAILY
Qty: 90 TABLET | Refills: 3 | Status: SHIPPED | OUTPATIENT
Start: 2021-03-15 | End: 2021-03-30 | Stop reason: SDUPTHER

## 2021-03-19 ENCOUNTER — BULK ORDERING (OUTPATIENT)
Dept: CASE MANAGEMENT | Facility: OTHER | Age: 66
End: 2021-03-19

## 2021-03-19 DIAGNOSIS — Z23 IMMUNIZATION DUE: ICD-10-CM

## 2021-03-30 DIAGNOSIS — E78.5 DYSLIPIDEMIA: Chronic | ICD-10-CM

## 2021-03-30 RX ORDER — FENOFIBRATE 160 MG/1
160 TABLET ORAL DAILY
Qty: 90 TABLET | Refills: 1 | Status: SHIPPED | OUTPATIENT
Start: 2021-03-30 | End: 2021-09-28

## 2021-03-30 NOTE — TELEPHONE ENCOUNTER
"CPA  Pt's wife called asking to have new prescription of \"fenofibrate 160 MG tablet (q90 day) sent to [facility] mail order please    CB# [phone]  "

## 2021-04-17 DIAGNOSIS — Z95.2 HX OF AORTIC VALVE REPLACEMENT, MECHANICAL: Chronic | ICD-10-CM

## 2021-04-19 ENCOUNTER — CLINICAL SUPPORT (OUTPATIENT)
Dept: CARDIOLOGY | Facility: CLINIC | Age: 66
End: 2021-04-19

## 2021-04-19 DIAGNOSIS — Z95.2 HX OF AORTIC VALVE REPLACEMENT, MECHANICAL: Primary | ICD-10-CM

## 2021-04-19 DIAGNOSIS — Z79.01 LONG TERM (CURRENT) USE OF ANTICOAGULANTS: ICD-10-CM

## 2021-04-19 LAB — INR PPP: 3.6 (ref 0.9–1.1)

## 2021-04-19 PROCEDURE — 85610 PROTHROMBIN TIME: CPT | Performed by: INTERNAL MEDICINE

## 2021-04-19 PROCEDURE — 36416 COLLJ CAPILLARY BLOOD SPEC: CPT | Performed by: INTERNAL MEDICINE

## 2021-04-19 RX ORDER — WARFARIN SODIUM 5 MG/1
TABLET ORAL
Qty: 30 TABLET | Refills: 0 | Status: SHIPPED | OUTPATIENT
Start: 2021-04-19 | End: 2021-06-14

## 2021-04-20 DIAGNOSIS — Z95.2 HX OF AORTIC VALVE REPLACEMENT, MECHANICAL: Primary | ICD-10-CM

## 2021-04-20 RX ORDER — WARFARIN SODIUM 2.5 MG/1
2.5 TABLET ORAL DAILY
Qty: 30 TABLET | Refills: 1 | Status: SHIPPED | OUTPATIENT
Start: 2021-04-20 | End: 2022-07-18

## 2021-04-20 NOTE — TELEPHONE ENCOUNTER
"CPA  Pt wife called asking for prescription of \"warfarin (Jantoven) 2.5 MG tablet be sent to [facility] please   They're having trouble cutting 5mg tabs    Pt CB# [phone]  "

## 2021-04-29 ENCOUNTER — ANTICOAGULATION VISIT (OUTPATIENT)
Dept: CARDIOLOGY | Facility: CLINIC | Age: 66
End: 2021-04-29

## 2021-04-29 DIAGNOSIS — Z95.2 HX OF AORTIC VALVE REPLACEMENT, MECHANICAL: Primary | ICD-10-CM

## 2021-04-29 DIAGNOSIS — Z79.01 LONG TERM (CURRENT) USE OF ANTICOAGULANTS: ICD-10-CM

## 2021-04-29 LAB — INR PPP: 2.6 (ref 0.9–1.1)

## 2021-04-29 PROCEDURE — 36416 COLLJ CAPILLARY BLOOD SPEC: CPT | Performed by: INTERNAL MEDICINE

## 2021-04-29 PROCEDURE — 85610 PROTHROMBIN TIME: CPT | Performed by: INTERNAL MEDICINE

## 2021-06-02 ENCOUNTER — CLINICAL SUPPORT (OUTPATIENT)
Dept: CARDIOLOGY | Facility: CLINIC | Age: 66
End: 2021-06-02

## 2021-06-02 ENCOUNTER — HOSPITAL ENCOUNTER (OUTPATIENT)
Dept: CARDIOLOGY | Facility: HOSPITAL | Age: 66
Discharge: HOME | End: 2021-06-02
Admitting: INTERNAL MEDICINE

## 2021-06-02 VITALS
WEIGHT: 228 LBS | SYSTOLIC BLOOD PRESSURE: 145 MMHG | DIASTOLIC BLOOD PRESSURE: 86 MMHG | HEIGHT: 68 IN | HEART RATE: 79 BPM | BODY MASS INDEX: 34.56 KG/M2

## 2021-06-02 DIAGNOSIS — Z95.2 HX OF AORTIC VALVE REPLACEMENT, MECHANICAL: Primary | ICD-10-CM

## 2021-06-02 DIAGNOSIS — Z95.2 HX OF AORTIC VALVE REPLACEMENT, MECHANICAL: ICD-10-CM

## 2021-06-02 DIAGNOSIS — Z79.01 LONG TERM (CURRENT) USE OF ANTICOAGULANTS: ICD-10-CM

## 2021-06-02 DIAGNOSIS — Z95.2 HX OF AORTIC VALVE REPLACEMENT, MECHANICAL: Chronic | ICD-10-CM

## 2021-06-02 LAB
AORTIC DIMENSIONLESS INDEX: 0.5 (DI)
BH CV ECHO AV AORTIC VALVE AT ACCEL TIME CALCULATED: 95 MSEC
BH CV ECHO MEAS - ACS: 2.1 CM
BH CV ECHO MEAS - AO ACC SLOPE: 1889 CM/SEC^2
BH CV ECHO MEAS - AO ACC TIME: 0.1 SEC
BH CV ECHO MEAS - AO ARCH DIAM (PROXIMAL TRANS.): 2.8 CM
BH CV ECHO MEAS - AO MAX PG (FULL): 12.8 MMHG
BH CV ECHO MEAS - AO MAX PG: 16 MMHG
BH CV ECHO MEAS - AO MEAN PG (FULL): 7 MMHG
BH CV ECHO MEAS - AO MEAN PG: 9 MMHG
BH CV ECHO MEAS - AO ROOT AREA (BSA CORRECTED): 1.9
BH CV ECHO MEAS - AO ROOT AREA: 12.6 CM^2
BH CV ECHO MEAS - AO ROOT DIAM: 4 CM
BH CV ECHO MEAS - AO V2 MAX: 200 CM/SEC
BH CV ECHO MEAS - AO V2 MEAN: 146 CM/SEC
BH CV ECHO MEAS - AO V2 VTI: 37.3 CM
BH CV ECHO MEAS - AT: 0.1 SEC
BH CV ECHO MEAS - AVA(I,A): 1.5 CM^2
BH CV ECHO MEAS - AVA(I,D): 1.5 CM^2
BH CV ECHO MEAS - AVA(V,A): 1.4 CM^2
BH CV ECHO MEAS - AVA(V,D): 1.4 CM^2
BH CV ECHO MEAS - BSA(HAYCOCK): 2.3 M^2
BH CV ECHO MEAS - BSA: 2.2 M^2
BH CV ECHO MEAS - BZI_BMI: 34.7 KILOGRAMS/M^2
BH CV ECHO MEAS - BZI_METRIC_HEIGHT: 172.7 CM
BH CV ECHO MEAS - BZI_METRIC_WEIGHT: 103.4 KG
BH CV ECHO MEAS - EDV(CUBED): 85.2 ML
BH CV ECHO MEAS - EDV(MOD-SP2): 107 ML
BH CV ECHO MEAS - EDV(MOD-SP4): 71 ML
BH CV ECHO MEAS - EDV(TEICH): 87.7 ML
BH CV ECHO MEAS - EF(CUBED): 40.5 %
BH CV ECHO MEAS - EF(MOD-BP): 45 %
BH CV ECHO MEAS - EF(MOD-SP2): 47.7 %
BH CV ECHO MEAS - EF(MOD-SP4): 46.5 %
BH CV ECHO MEAS - EF(TEICH): 33.7 %
BH CV ECHO MEAS - ESV(CUBED): 50.7 ML
BH CV ECHO MEAS - ESV(MOD-SP2): 56 ML
BH CV ECHO MEAS - ESV(MOD-SP4): 38 ML
BH CV ECHO MEAS - ESV(TEICH): 58.1 ML
BH CV ECHO MEAS - FS: 15.9 %
BH CV ECHO MEAS - IVS/LVPW: 1
BH CV ECHO MEAS - IVSD: 1.4 CM
BH CV ECHO MEAS - LAT PEAK E' VEL: 7.4 CM/SEC
BH CV ECHO MEAS - LV DIASTOLIC VOL/BSA (35-75): 32.9 ML/M^2
BH CV ECHO MEAS - LV MASS(C)D: 240.3 GRAMS
BH CV ECHO MEAS - LV MASS(C)DI: 111.2 GRAMS/M^2
BH CV ECHO MEAS - LV MAX PG: 3.2 MMHG
BH CV ECHO MEAS - LV MEAN PG: 2 MMHG
BH CV ECHO MEAS - LV SYSTOLIC VOL/BSA (12-30): 17.6 ML/M^2
BH CV ECHO MEAS - LV V1 MAX: 88.8 CM/SEC
BH CV ECHO MEAS - LV V1 MEAN: 65.5 CM/SEC
BH CV ECHO MEAS - LV V1 VTI: 17.9 CM
BH CV ECHO MEAS - LVIDD: 4.4 CM
BH CV ECHO MEAS - LVIDS: 3.7 CM
BH CV ECHO MEAS - LVLD AP2: 7.8 CM
BH CV ECHO MEAS - LVLD AP4: 7.7 CM
BH CV ECHO MEAS - LVLS AP2: 7.3 CM
BH CV ECHO MEAS - LVLS AP4: 6.8 CM
BH CV ECHO MEAS - LVOT AREA (M): 3.1 CM^2
BH CV ECHO MEAS - LVOT AREA: 3.1 CM^2
BH CV ECHO MEAS - LVOT DIAM: 2 CM
BH CV ECHO MEAS - LVPWD: 1.4 CM
BH CV ECHO MEAS - MED PEAK E' VEL: 6.1 CM/SEC
BH CV ECHO MEAS - MV A DUR: 0.16 SEC
BH CV ECHO MEAS - MV A MAX VEL: 89.3 CM/SEC
BH CV ECHO MEAS - MV DEC SLOPE: 221.5 CM/SEC^2
BH CV ECHO MEAS - MV DEC TIME: 0.28 SEC
BH CV ECHO MEAS - MV E MAX VEL: 49.4 CM/SEC
BH CV ECHO MEAS - MV E/A: 0.55
BH CV ECHO MEAS - MV MEAN PG: 2 MMHG
BH CV ECHO MEAS - MV P1/2T MAX VEL: 68.8 CM/SEC
BH CV ECHO MEAS - MV P1/2T: 91 MSEC
BH CV ECHO MEAS - MV V2 MEAN: 65.8 CM/SEC
BH CV ECHO MEAS - MV V2 VTI: 20.5 CM
BH CV ECHO MEAS - MVA P1/2T LCG: 3.2 CM^2
BH CV ECHO MEAS - MVA(P1/2T): 2.4 CM^2
BH CV ECHO MEAS - MVA(VTI): 2.7 CM^2
BH CV ECHO MEAS - PA ACC SLOPE: 700 CM/SEC^2
BH CV ECHO MEAS - PA ACC TIME: 0.13 SEC
BH CV ECHO MEAS - PA MAX PG (FULL): 0.48 MMHG
BH CV ECHO MEAS - PA MAX PG: 2.8 MMHG
BH CV ECHO MEAS - PA PR(ACCEL): 18.7 MMHG
BH CV ECHO MEAS - PA V2 MAX: 84.2 CM/SEC
BH CV ECHO MEAS - PVA(V,A): 3.5 CM^2
BH CV ECHO MEAS - PVA(V,D): 3.5 CM^2
BH CV ECHO MEAS - QP/QS: 1.1
BH CV ECHO MEAS - RV MAX PG: 2.4 MMHG
BH CV ECHO MEAS - RV MEAN PG: 1 MMHG
BH CV ECHO MEAS - RV V1 MAX: 76.7 CM/SEC
BH CV ECHO MEAS - RV V1 MEAN: 54.6 CM/SEC
BH CV ECHO MEAS - RV V1 VTI: 16.5 CM
BH CV ECHO MEAS - RVOT AREA: 3.8 CM^2
BH CV ECHO MEAS - RVOT DIAM: 2.2 CM
BH CV ECHO MEAS - SI(AO): 216.9 ML/M^2
BH CV ECHO MEAS - SI(CUBED): 16 ML/M^2
BH CV ECHO MEAS - SI(LVOT): 26 ML/M^2
BH CV ECHO MEAS - SI(MOD-SP2): 23.6 ML/M^2
BH CV ECHO MEAS - SI(MOD-SP4): 15.3 ML/M^2
BH CV ECHO MEAS - SI(TEICH): 13.7 ML/M^2
BH CV ECHO MEAS - SV(AO): 468.7 ML
BH CV ECHO MEAS - SV(CUBED): 34.5 ML
BH CV ECHO MEAS - SV(LVOT): 56.2 ML
BH CV ECHO MEAS - SV(MOD-SP2): 51 ML
BH CV ECHO MEAS - SV(MOD-SP4): 33 ML
BH CV ECHO MEAS - SV(RVOT): 62.7 ML
BH CV ECHO MEAS - SV(TEICH): 29.6 ML
BH CV ECHO MEAS - TAPSE (>1.6): 1.5 CM
BH CV ECHO MEASUREMENTS AVERAGE E/E' RATIO: 7.32
BH CV XLRA - RV BASE: 3.3 CM
BH CV XLRA - RV LENGTH: 2.8 CM
BH CV XLRA - RV MID: 2.8 CM
BH CV XLRA - TDI S': 10.9 CM/SEC
INR PPP: 3.3 (ref 2.5–3.5)
LEFT ATRIUM VOLUME INDEX: 23 ML/M2
MAXIMAL PREDICTED HEART RATE: 153 BPM
STRESS TARGET HR: 130 BPM

## 2021-06-02 PROCEDURE — 93306 TTE W/DOPPLER COMPLETE: CPT

## 2021-06-02 PROCEDURE — 93306 TTE W/DOPPLER COMPLETE: CPT | Performed by: INTERNAL MEDICINE

## 2021-06-02 PROCEDURE — 36416 COLLJ CAPILLARY BLOOD SPEC: CPT | Performed by: INTERNAL MEDICINE

## 2021-06-02 PROCEDURE — 85610 PROTHROMBIN TIME: CPT | Performed by: INTERNAL MEDICINE

## 2021-06-11 DIAGNOSIS — Z95.2 HX OF AORTIC VALVE REPLACEMENT, MECHANICAL: Chronic | ICD-10-CM

## 2021-06-14 RX ORDER — WARFARIN SODIUM 5 MG/1
TABLET ORAL
Qty: 90 TABLET | Refills: 0 | Status: SHIPPED | OUTPATIENT
Start: 2021-06-14 | End: 2021-09-09

## 2021-07-07 ENCOUNTER — ANTICOAGULATION VISIT (OUTPATIENT)
Dept: CARDIOLOGY | Facility: CLINIC | Age: 66
End: 2021-07-07

## 2021-07-07 DIAGNOSIS — Z79.01 LONG TERM (CURRENT) USE OF ANTICOAGULANTS: ICD-10-CM

## 2021-07-07 DIAGNOSIS — Z95.2 HX OF AORTIC VALVE REPLACEMENT, MECHANICAL: Primary | ICD-10-CM

## 2021-07-07 LAB — INR PPP: 3.7 (ref 2.5–3.5)

## 2021-07-07 PROCEDURE — 36416 COLLJ CAPILLARY BLOOD SPEC: CPT | Performed by: INTERNAL MEDICINE

## 2021-07-07 PROCEDURE — 85610 PROTHROMBIN TIME: CPT | Performed by: INTERNAL MEDICINE

## 2021-07-29 ENCOUNTER — CLINICAL SUPPORT (OUTPATIENT)
Dept: CARDIOLOGY | Facility: CLINIC | Age: 66
End: 2021-07-29

## 2021-07-29 DIAGNOSIS — Z79.01 LONG TERM (CURRENT) USE OF ANTICOAGULANTS: ICD-10-CM

## 2021-07-29 DIAGNOSIS — Z95.2 HX OF AORTIC VALVE REPLACEMENT, MECHANICAL: Primary | ICD-10-CM

## 2021-07-29 LAB — INR PPP: 3 (ref 0.9–1.1)

## 2021-07-29 PROCEDURE — 85610 PROTHROMBIN TIME: CPT | Performed by: INTERNAL MEDICINE

## 2021-07-29 PROCEDURE — 36416 COLLJ CAPILLARY BLOOD SPEC: CPT | Performed by: INTERNAL MEDICINE

## 2021-09-03 ENCOUNTER — CLINICAL SUPPORT (OUTPATIENT)
Dept: CARDIOLOGY | Facility: CLINIC | Age: 66
End: 2021-09-03

## 2021-09-03 DIAGNOSIS — Z79.01 LONG TERM (CURRENT) USE OF ANTICOAGULANTS: ICD-10-CM

## 2021-09-03 DIAGNOSIS — Z95.2 HX OF AORTIC VALVE REPLACEMENT, MECHANICAL: Primary | ICD-10-CM

## 2021-09-03 LAB — INR PPP: 3.3 (ref 2.5–3.5)

## 2021-09-03 PROCEDURE — 85610 PROTHROMBIN TIME: CPT | Performed by: INTERNAL MEDICINE

## 2021-09-03 PROCEDURE — 36416 COLLJ CAPILLARY BLOOD SPEC: CPT | Performed by: INTERNAL MEDICINE

## 2021-09-09 DIAGNOSIS — Z95.2 HX OF AORTIC VALVE REPLACEMENT, MECHANICAL: Chronic | ICD-10-CM

## 2021-09-09 RX ORDER — WARFARIN SODIUM 5 MG/1
TABLET ORAL
Qty: 90 TABLET | Refills: 0 | Status: SHIPPED | OUTPATIENT
Start: 2021-09-09 | End: 2021-12-08

## 2021-09-28 DIAGNOSIS — E78.5 DYSLIPIDEMIA: Chronic | ICD-10-CM

## 2021-09-28 RX ORDER — FENOFIBRATE 160 MG/1
TABLET ORAL
Qty: 90 TABLET | Refills: 3 | Status: SHIPPED | OUTPATIENT
Start: 2021-09-28 | End: 2022-09-27

## 2021-10-07 DIAGNOSIS — I10 ESSENTIAL HYPERTENSION: ICD-10-CM

## 2021-10-07 RX ORDER — IRBESARTAN 75 MG/1
75 TABLET ORAL EVERY EVENING
Qty: 90 TABLET | Refills: 0 | Status: SHIPPED | OUTPATIENT
Start: 2021-10-07 | End: 2022-01-17

## 2021-10-11 ENCOUNTER — CLINICAL SUPPORT (OUTPATIENT)
Dept: CARDIOLOGY | Facility: CLINIC | Age: 66
End: 2021-10-11

## 2021-10-11 DIAGNOSIS — Z95.2 HX OF AORTIC VALVE REPLACEMENT, MECHANICAL: Primary | ICD-10-CM

## 2021-10-11 DIAGNOSIS — Z79.01 LONG TERM (CURRENT) USE OF ANTICOAGULANTS: ICD-10-CM

## 2021-10-11 LAB — INR PPP: 2.8 (ref 0.9–1.1)

## 2021-10-11 PROCEDURE — 85610 PROTHROMBIN TIME: CPT | Performed by: INTERNAL MEDICINE

## 2021-10-11 PROCEDURE — 36416 COLLJ CAPILLARY BLOOD SPEC: CPT | Performed by: INTERNAL MEDICINE

## 2021-11-01 ENCOUNTER — OFFICE VISIT (OUTPATIENT)
Dept: CARDIOLOGY | Facility: CLINIC | Age: 66
End: 2021-11-01

## 2021-11-01 VITALS
HEART RATE: 66 BPM | WEIGHT: 225 LBS | DIASTOLIC BLOOD PRESSURE: 78 MMHG | HEIGHT: 68 IN | BODY MASS INDEX: 34.1 KG/M2 | SYSTOLIC BLOOD PRESSURE: 132 MMHG

## 2021-11-01 DIAGNOSIS — Z79.01 LONG TERM (CURRENT) USE OF ANTICOAGULANTS: ICD-10-CM

## 2021-11-01 DIAGNOSIS — Z95.828 HX OF ASCENDING AORTA REPLACEMENT: Chronic | ICD-10-CM

## 2021-11-01 DIAGNOSIS — I10 ESSENTIAL HYPERTENSION: Chronic | ICD-10-CM

## 2021-11-01 DIAGNOSIS — E78.2 MIXED HYPERLIPIDEMIA: Chronic | ICD-10-CM

## 2021-11-01 DIAGNOSIS — I42.0 CARDIOMYOPATHY, DILATED (HCC): Chronic | ICD-10-CM

## 2021-11-01 DIAGNOSIS — Z95.2 HX OF AORTIC VALVE REPLACEMENT, MECHANICAL: Primary | Chronic | ICD-10-CM

## 2021-11-01 LAB — INR PPP: 2.7 (ref 2.5–3.5)

## 2021-11-01 PROCEDURE — 36416 COLLJ CAPILLARY BLOOD SPEC: CPT | Performed by: INTERNAL MEDICINE

## 2021-11-01 PROCEDURE — 85610 PROTHROMBIN TIME: CPT | Performed by: INTERNAL MEDICINE

## 2021-11-01 PROCEDURE — 99214 OFFICE O/P EST MOD 30 MIN: CPT | Performed by: INTERNAL MEDICINE

## 2021-11-01 RX ORDER — METFORMIN HYDROCHLORIDE 500 MG/1
1 TABLET, EXTENDED RELEASE ORAL 2 TIMES DAILY
COMMUNITY
Start: 2021-08-05

## 2021-11-01 RX ORDER — ICOSAPENT ETHYL 1000 MG/1
2 CAPSULE ORAL 2 TIMES DAILY WITH MEALS
Qty: 120 CAPSULE | Refills: 5 | Status: SHIPPED | OUTPATIENT
Start: 2021-11-01

## 2021-11-01 NOTE — PROGRESS NOTES
"Chief Complaint  Cardiac Valve Problem    Subjective    History of Present Illness      I had the pleasure of seeing Mr. Jorge in the office. He is a very pleasant 66-year-old gentleman with a history of a bicuspid aortic valve.  He underwent aortic valve surgery in 1995 and had a [employer] mechanical valve placed.  In December 2009 he had replacement of the ascending aorta and aortic valve.  Bravo also has a history of hypertension and a right bundle branch block with left anterior fascicular block.  He has a history of dyslipidemia and diabetes mellitus.    He underwent cardiac catheterization in February 2020 which demonstrated normal coronary arteries and an ejection fraction of 42%.  He had a CT scan of his chest which demonstrated a stable aorta.   He had an echocardiogram in June 2021 which demonstrated an ejection fraction of 45%.  The mean gradient across the aortic valve is 9 mmHg.  The aortic valve area is 1.5 cm².  There was mild aortic stenosis.  There was mild mitral and tricuspid insufficiency and mild aortic root dilatation    Objective   Vital Signs:   /78   Pulse 66   Ht 172.7 cm (68\")   Wt 102 kg (225 lb)   BMI 34.21 kg/m²     Vitals and nursing note reviewed.   Constitutional:       General: Not in acute distress.     Appearance: Healthy appearance. Well-developed. Not diaphoretic.   Eyes:      General: No scleral icterus.     Conjunctiva/sclera: Conjunctivae normal.      Pupils: Pupils are equal, round, and reactive to light.   HENT:      Head: Normocephalic and atraumatic.   Neck:      Thyroid: Thyroid normal. No thyromegaly.      Vascular: JVD normal.      Lymphadenopathy: No cervical adenopathy.   Pulmonary:      Effort: Pulmonary effort is normal. No respiratory distress.      Breath sounds: Normal breath sounds. No wheezing. No rales.   Cardiovascular:      PMI at left midclavicular line. Normal rate. Regular rhythm. Normal S1. Normal S2.      Murmurs:  There is a 1/6 systolic " murmur heard at the upper right sternal border      No gallop. No S3 and S4 gallop. No click. No rub.      Comments: Crisp mechanical aortic valve sounds  Pulses:     Intact distal pulses. No decreased pulses.   Edema:     Peripheral edema absent.   Abdominal:      General: Bowel sounds are normal. There is no distension.      Palpations: Abdomen is soft. There is no abdominal mass.      Tenderness: There is no abdominal tenderness. There is no guarding or rebound.   Musculoskeletal: Normal range of motion.      Cervical back: Normal range of motion and neck supple. Skin:     General: Skin is warm and dry.      Coloration: Skin is not pale.      Findings: No rash.   Neurological:      Mental Status: Alert, oriented to person, place, and time and oriented to person, place and time.      Coordination: Coordination normal.      Gait: Gait is intact.   Psychiatric:         Behavior: Behavior normal.           Result Review :   The following data was reviewed by: Jelly Florence MD on 11/01/2021:           Bravo had lab work on September 3, 2021.  Total cholesterol is 114, HDL 26, triglycerides 301, LDL 56.  Glucose 239, BUN 28, creatinine 1.58.  Potassium 4.7, hemoglobin A1c is 10.9.  Hemoglobin 14.0.     Assessment and Plan    1. Hx of aortic valve replacement, mechanical [Z95.2]  1995, aortic valve replacement secondary to a bicuspid aortic valve  2010: Replacement of the aortic valve with an aortic valve conduit with a #25 [employer] mechanical medical valve and replacement of ascending aorta.  Stable.  On exam his mechanical aortic valve sounds are crisp.  He remains on warfarin and aspirin    2. Hx of ascending aorta replacement  2010: Replacement of ascending aorta  CT scan on January 31, 2020 demonstrated aortic root 3.2 cm, ascending aorta 3.3 cm.  Distal aortic arch 3 cm mid ascending thoracic aorta 3.6 cm.    3. Cardiomyopathy, dilated (HCC)  Echo June 2021: EF 45%    4. Essential hypertension  Fair  control    5. Mixed hyperlipidemia  His HDL remains low and triglycerides remain elevated.  We will try Vascepa    He was recently started on Farxiga for his diabetes however this may prove to be cost prohibitive.        Follow Up   No follow-ups on file.  Patient was given instructions and counseling regarding his condition or for health maintenance advice. Please see specific information pulled into the AVS if appropriate.

## 2021-11-09 DIAGNOSIS — I10 ESSENTIAL HYPERTENSION: ICD-10-CM

## 2021-11-09 DIAGNOSIS — Z95.828 HX OF ASCENDING AORTA REPLACEMENT: ICD-10-CM

## 2021-11-09 RX ORDER — METOPROLOL SUCCINATE 25 MG/1
25 TABLET, EXTENDED RELEASE ORAL DAILY
Qty: 90 TABLET | Refills: 3 | Status: SHIPPED | OUTPATIENT
Start: 2021-11-09 | End: 2022-07-05

## 2021-12-01 ENCOUNTER — CLINICAL SUPPORT (OUTPATIENT)
Dept: CARDIOLOGY | Facility: CLINIC | Age: 66
End: 2021-12-01

## 2021-12-01 DIAGNOSIS — Z79.01 LONG TERM (CURRENT) USE OF ANTICOAGULANTS: ICD-10-CM

## 2021-12-01 DIAGNOSIS — Z95.2 HX OF AORTIC VALVE REPLACEMENT, MECHANICAL: Primary | ICD-10-CM

## 2021-12-01 LAB — INR PPP: 2.5 (ref 2.5–3.5)

## 2021-12-01 PROCEDURE — 36416 COLLJ CAPILLARY BLOOD SPEC: CPT | Performed by: INTERNAL MEDICINE

## 2021-12-01 PROCEDURE — 85610 PROTHROMBIN TIME: CPT | Performed by: INTERNAL MEDICINE

## 2021-12-08 DIAGNOSIS — Z95.2 HX OF AORTIC VALVE REPLACEMENT, MECHANICAL: Chronic | ICD-10-CM

## 2021-12-08 RX ORDER — WARFARIN SODIUM 5 MG/1
TABLET ORAL
Qty: 90 TABLET | Refills: 0 | Status: SHIPPED | OUTPATIENT
Start: 2021-12-08 | End: 2022-03-08

## 2022-01-03 ENCOUNTER — CLINICAL SUPPORT (OUTPATIENT)
Dept: CARDIOLOGY | Facility: CLINIC | Age: 67
End: 2022-01-03

## 2022-01-03 DIAGNOSIS — Z79.01 LONG TERM (CURRENT) USE OF ANTICOAGULANTS: ICD-10-CM

## 2022-01-03 DIAGNOSIS — Z95.2 HX OF AORTIC VALVE REPLACEMENT, MECHANICAL: Primary | ICD-10-CM

## 2022-01-03 LAB — INR PPP: 3.1 (ref 0.9–1.1)

## 2022-01-03 PROCEDURE — 85610 PROTHROMBIN TIME: CPT | Performed by: INTERNAL MEDICINE

## 2022-01-03 PROCEDURE — 36416 COLLJ CAPILLARY BLOOD SPEC: CPT | Performed by: INTERNAL MEDICINE

## 2022-01-15 DIAGNOSIS — I10 ESSENTIAL HYPERTENSION: ICD-10-CM

## 2022-01-17 RX ORDER — IRBESARTAN 75 MG/1
TABLET ORAL
Qty: 90 TABLET | Refills: 1 | Status: SHIPPED | OUTPATIENT
Start: 2022-01-17 | End: 2022-06-30

## 2022-02-07 ENCOUNTER — CLINICAL SUPPORT (OUTPATIENT)
Dept: CARDIOLOGY | Facility: CLINIC | Age: 67
End: 2022-02-07

## 2022-02-07 DIAGNOSIS — Z95.2 HX OF AORTIC VALVE REPLACEMENT, MECHANICAL: Primary | ICD-10-CM

## 2022-02-07 DIAGNOSIS — Z79.01 LONG TERM (CURRENT) USE OF ANTICOAGULANTS: ICD-10-CM

## 2022-02-07 LAB — INR PPP: 3.1 (ref 0.9–1.1)

## 2022-02-07 PROCEDURE — 36416 COLLJ CAPILLARY BLOOD SPEC: CPT | Performed by: INTERNAL MEDICINE

## 2022-02-07 PROCEDURE — 85610 PROTHROMBIN TIME: CPT | Performed by: INTERNAL MEDICINE

## 2022-03-08 DIAGNOSIS — Z95.2 HX OF AORTIC VALVE REPLACEMENT, MECHANICAL: Chronic | ICD-10-CM

## 2022-03-08 RX ORDER — WARFARIN SODIUM 5 MG/1
TABLET ORAL
Qty: 90 TABLET | Refills: 0 | Status: SHIPPED | OUTPATIENT
Start: 2022-03-08 | End: 2022-06-06

## 2022-03-09 ENCOUNTER — CLINICAL SUPPORT (OUTPATIENT)
Dept: CARDIOLOGY | Facility: CLINIC | Age: 67
End: 2022-03-09

## 2022-03-09 DIAGNOSIS — Z79.01 LONG TERM (CURRENT) USE OF ANTICOAGULANTS: ICD-10-CM

## 2022-03-09 DIAGNOSIS — Z95.2 HX OF AORTIC VALVE REPLACEMENT, MECHANICAL: Primary | ICD-10-CM

## 2022-03-09 LAB — INR PPP: 3.4 (ref 2.5–3.5)

## 2022-03-09 PROCEDURE — 36416 COLLJ CAPILLARY BLOOD SPEC: CPT | Performed by: INTERNAL MEDICINE

## 2022-03-09 PROCEDURE — 85610 PROTHROMBIN TIME: CPT | Performed by: INTERNAL MEDICINE

## 2022-04-12 ENCOUNTER — CLINICAL SUPPORT (OUTPATIENT)
Dept: CARDIOLOGY | Facility: CLINIC | Age: 67
End: 2022-04-12

## 2022-04-12 DIAGNOSIS — Z95.2 HX OF AORTIC VALVE REPLACEMENT, MECHANICAL: Primary | ICD-10-CM

## 2022-04-12 DIAGNOSIS — Z79.01 LONG TERM (CURRENT) USE OF ANTICOAGULANTS: ICD-10-CM

## 2022-04-12 LAB — INR PPP: 3.2 (ref 0.9–1.1)

## 2022-04-12 PROCEDURE — 85610 PROTHROMBIN TIME: CPT | Performed by: INTERNAL MEDICINE

## 2022-04-12 PROCEDURE — 36416 COLLJ CAPILLARY BLOOD SPEC: CPT | Performed by: INTERNAL MEDICINE

## 2022-05-05 NOTE — PROGRESS NOTES
"Chief Complaint  Follow-up    Subjective    History of Present Illness      I had the pleasure of seeing Mr. Jorge in the office. He is a very pleasant 66-year-old gentleman with a history of a bicuspid aortic valve.  He underwent aortic valve surgery in 1995 and had a [employer] mechanical valve placed.  In December 2009 he had replacement of the ascending aorta and aortic valve.  Bravo also has a history of hypertension and a right bundle branch block with left anterior fascicular block.  He has a history of dyslipidemia and diabetes mellitus.    He underwent cardiac catheterization in February 2020 which demonstrated normal coronary arteries and an ejection fraction of 42%.  He had a CT scan of his chest which demonstrated a stable aorta.   He had an echocardiogram in June 2021 which demonstrated an ejection fraction of 45%.  The mean gradient across the aortic valve is 9 mmHg.  The aortic valve area is 1.5 cm².  There was mild aortic stenosis.  There was mild mitral and tricuspid insufficiency and mild aortic root dilatation.    Bravo states that he is feeling well.  He is not complaining of chest discomfort or shortness of air.  No palpitations, dizziness or near syncope.  No lower extremity edema, orthopnea or paroxysmal nocturnal dyspnea.  He does monitor his blood pressure at home and states it is generally within normal limits.    Objective   Vital Signs:   /88   Pulse 59   Ht 172.7 cm (68\")   Wt 98.9 kg (218 lb)   BMI 33.15 kg/m²     Vitals and nursing note reviewed.   Constitutional:       General: Not in acute distress.     Appearance: Healthy appearance. Well-developed. Not diaphoretic.   Eyes:      General: No scleral icterus.     Conjunctiva/sclera: Conjunctivae normal.      Pupils: Pupils are equal, round, and reactive to light.   HENT:      Head: Normocephalic and atraumatic.   Neck:      Thyroid: Thyroid normal. No thyromegaly.      Vascular: JVD normal.      Lymphadenopathy: No " cervical adenopathy.   Pulmonary:      Effort: Pulmonary effort is normal. No respiratory distress.      Breath sounds: Normal breath sounds. No wheezing. No rales.   Cardiovascular:      PMI at left midclavicular line. Normal rate. Regular rhythm. Normal S1. Normal S2.      Murmurs:  There is a 1/6 systolic murmur heard at the upper right sternal border      No gallop. No S3 and S4 gallop. No click. No rub.      Comments: Crisp mechanical aortic valve sounds  Pulses:     Intact distal pulses. No decreased pulses.   Edema:     Peripheral edema absent.   Abdominal:      General: Bowel sounds are normal. There is no distension.      Palpations: Abdomen is soft. There is no abdominal mass.      Tenderness: There is no abdominal tenderness. There is no guarding or rebound.   Musculoskeletal: Normal range of motion.      Cervical back: Normal range of motion and neck supple. Skin:     General: Skin is warm and dry.      Coloration: Skin is not pale.      Findings: No rash.   Neurological:      Mental Status: Alert, oriented to person, place, and time and oriented to person, place and time.      Coordination: Coordination normal.      Gait: Gait is intact.   Psychiatric:         Behavior: Behavior normal.         Result Review :   The following data was reviewed by: Jelly Florence MD on 05/09/2022:                            Assessment and Plan    1. Hx of aortic valve replacement, mechanical [Z95.2]  1995, aortic valve replacement secondary to a bicuspid aortic valve  February 2010: Replacement of the aortic valve with a # 25 [employer] mechanical prosthesis and replacement of the ascending aorta    2. Hx of ascending aorta replacement  Stable    3. Essential hypertension  He monitors his blood pressure at home.  He states it is generally in the 120/70 range    4. Dyslipidemia  He is on atorvastatin 40 mg daily.  He recently had lab work obtained.  We will obtain a copy from Dr. Romero    5. Cardiomyopathy, dilated  (HCC)  No symptoms of congestive heart failure.  We will repeat his echocardiogram this year        Follow Up   No follow-ups on file.  Patient was given instructions and counseling regarding his condition or for health maintenance advice. Please see specific information pulled into the AVS if appropriate.

## 2022-05-09 ENCOUNTER — OFFICE VISIT (OUTPATIENT)
Dept: CARDIOLOGY | Facility: CLINIC | Age: 67
End: 2022-05-09

## 2022-05-09 VITALS
BODY MASS INDEX: 33.04 KG/M2 | HEIGHT: 68 IN | DIASTOLIC BLOOD PRESSURE: 72 MMHG | SYSTOLIC BLOOD PRESSURE: 122 MMHG | WEIGHT: 218 LBS | HEART RATE: 59 BPM

## 2022-05-09 DIAGNOSIS — I10 ESSENTIAL HYPERTENSION: Chronic | ICD-10-CM

## 2022-05-09 DIAGNOSIS — E78.5 DYSLIPIDEMIA: Chronic | ICD-10-CM

## 2022-05-09 DIAGNOSIS — Z79.01 LONG TERM (CURRENT) USE OF ANTICOAGULANTS: ICD-10-CM

## 2022-05-09 DIAGNOSIS — Z95.828 HX OF ASCENDING AORTA REPLACEMENT: Chronic | ICD-10-CM

## 2022-05-09 DIAGNOSIS — I42.0 CARDIOMYOPATHY, DILATED: Chronic | ICD-10-CM

## 2022-05-09 DIAGNOSIS — Z95.2 HX OF AORTIC VALVE REPLACEMENT, MECHANICAL: Primary | Chronic | ICD-10-CM

## 2022-05-09 LAB — INR PPP: 3.8 (ref 2.5–3.5)

## 2022-05-09 PROCEDURE — 36416 COLLJ CAPILLARY BLOOD SPEC: CPT | Performed by: INTERNAL MEDICINE

## 2022-05-09 PROCEDURE — 85610 PROTHROMBIN TIME: CPT | Performed by: INTERNAL MEDICINE

## 2022-05-09 PROCEDURE — 99214 OFFICE O/P EST MOD 30 MIN: CPT | Performed by: INTERNAL MEDICINE

## 2022-05-09 RX ORDER — DAPAGLIFLOZIN 10 MG/1
10 TABLET, FILM COATED ORAL DAILY
COMMUNITY
Start: 2022-02-17

## 2022-06-06 DIAGNOSIS — Z95.2 HX OF AORTIC VALVE REPLACEMENT, MECHANICAL: Chronic | ICD-10-CM

## 2022-06-06 RX ORDER — WARFARIN SODIUM 5 MG/1
TABLET ORAL
Qty: 90 TABLET | Refills: 0 | Status: SHIPPED | OUTPATIENT
Start: 2022-06-06 | End: 2022-09-01

## 2022-06-30 DIAGNOSIS — I10 ESSENTIAL HYPERTENSION: ICD-10-CM

## 2022-06-30 RX ORDER — IRBESARTAN 75 MG/1
TABLET ORAL
Qty: 90 TABLET | Refills: 1 | Status: SHIPPED | OUTPATIENT
Start: 2022-06-30 | End: 2023-01-06

## 2022-07-01 ENCOUNTER — CLINICAL SUPPORT (OUTPATIENT)
Dept: CARDIOLOGY | Facility: CLINIC | Age: 67
End: 2022-07-01

## 2022-07-01 DIAGNOSIS — Z79.01 LONG TERM (CURRENT) USE OF ANTICOAGULANTS: ICD-10-CM

## 2022-07-01 DIAGNOSIS — Z95.2 HX OF AORTIC VALVE REPLACEMENT, MECHANICAL: Primary | ICD-10-CM

## 2022-07-01 LAB — INR PPP: 2.5 (ref 0.9–1.1)

## 2022-07-01 PROCEDURE — 36416 COLLJ CAPILLARY BLOOD SPEC: CPT | Performed by: INTERNAL MEDICINE

## 2022-07-01 PROCEDURE — 85610 PROTHROMBIN TIME: CPT | Performed by: INTERNAL MEDICINE

## 2022-07-02 DIAGNOSIS — Z95.828 HX OF ASCENDING AORTA REPLACEMENT: ICD-10-CM

## 2022-07-02 DIAGNOSIS — I10 ESSENTIAL HYPERTENSION: ICD-10-CM

## 2022-07-05 RX ORDER — METOPROLOL SUCCINATE 25 MG/1
TABLET, EXTENDED RELEASE ORAL
Qty: 90 TABLET | Refills: 1 | Status: SHIPPED | OUTPATIENT
Start: 2022-07-05

## 2022-07-15 ENCOUNTER — HOSPITAL ENCOUNTER (OUTPATIENT)
Dept: CARDIOLOGY | Facility: HOSPITAL | Age: 67
Discharge: HOME | End: 2022-07-15
Admitting: INTERNAL MEDICINE

## 2022-07-15 VITALS
BODY MASS INDEX: 33.04 KG/M2 | DIASTOLIC BLOOD PRESSURE: 88 MMHG | WEIGHT: 218 LBS | HEART RATE: 68 BPM | HEIGHT: 68 IN | SYSTOLIC BLOOD PRESSURE: 144 MMHG

## 2022-07-15 DIAGNOSIS — Z95.828 HX OF ASCENDING AORTA REPLACEMENT: ICD-10-CM

## 2022-07-15 DIAGNOSIS — I42.0 CARDIOMYOPATHY, DILATED: ICD-10-CM

## 2022-07-15 DIAGNOSIS — Z95.2 HX OF AORTIC VALVE REPLACEMENT, MECHANICAL: ICD-10-CM

## 2022-07-15 DIAGNOSIS — Z95.2 HX OF AORTIC VALVE REPLACEMENT, MECHANICAL: Chronic | ICD-10-CM

## 2022-07-15 LAB
AORTIC DIMENSIONLESS INDEX: 0.4 (DI)
ASCENDING AORTA: 3.2 CM
BH CV ECHO AV AORTIC VALVE AT ACCEL TIME CALCULATED: 79 MSEC
BH CV ECHO MEAS - AO MAX PG: 16.8 MMHG
BH CV ECHO MEAS - AO MEAN PG: 8.7 MMHG
BH CV ECHO MEAS - AO ROOT DIAM: 3.6 CM
BH CV ECHO MEAS - AO V2 MAX: 205.2 CM/SEC
BH CV ECHO MEAS - AO V2 VTI: 37.7 CM
BH CV ECHO MEAS - AT: 0.08 SEC
BH CV ECHO MEAS - AVA(I,D): 1.17 CM2
BH CV ECHO MEAS - EDV(CUBED): 256.7 ML
BH CV ECHO MEAS - EDV(MOD-SP2): 102 ML
BH CV ECHO MEAS - EDV(MOD-SP4): 117 ML
BH CV ECHO MEAS - EF(MOD-BP): 45.8 %
BH CV ECHO MEAS - EF(MOD-SP2): 44.1 %
BH CV ECHO MEAS - EF(MOD-SP4): 47.9 %
BH CV ECHO MEAS - ESV(CUBED): 95.1 ML
BH CV ECHO MEAS - ESV(MOD-SP2): 57 ML
BH CV ECHO MEAS - ESV(MOD-SP4): 61 ML
BH CV ECHO MEAS - FS: 28.2 %
BH CV ECHO MEAS - IVS/LVPW: 1.04 CM
BH CV ECHO MEAS - IVSD: 0.98 CM
BH CV ECHO MEAS - LAT PEAK E' VEL: 8.7 CM/SEC
BH CV ECHO MEAS - LV MASS(C)D: 260.7 GRAMS
BH CV ECHO MEAS - LV MAX PG: 1.81 MMHG
BH CV ECHO MEAS - LV MEAN PG: 0.97 MMHG
BH CV ECHO MEAS - LV V1 MAX: 67.3 CM/SEC
BH CV ECHO MEAS - LV V1 VTI: 14.4 CM
BH CV ECHO MEAS - LVIDD: 6.4 CM
BH CV ECHO MEAS - LVIDS: 4.6 CM
BH CV ECHO MEAS - LVOT AREA: 3.1 CM2
BH CV ECHO MEAS - LVOT DIAM: 1.97 CM
BH CV ECHO MEAS - LVPWD: 0.95 CM
BH CV ECHO MEAS - MED PEAK E' VEL: 8.5 CM/SEC
BH CV ECHO MEAS - MV A DUR: 0.11 SEC
BH CV ECHO MEAS - MV A MAX VEL: 93.4 CM/SEC
BH CV ECHO MEAS - MV DEC SLOPE: 158 CM/SEC2
BH CV ECHO MEAS - MV DEC TIME: 0.21 MSEC
BH CV ECHO MEAS - MV E MAX VEL: 63 CM/SEC
BH CV ECHO MEAS - MV E/A: 0.67
BH CV ECHO MEAS - MV MAX PG: 3.7 MMHG
BH CV ECHO MEAS - MV MEAN PG: 1.09 MMHG
BH CV ECHO MEAS - MV P1/2T: 132.3 MSEC
BH CV ECHO MEAS - MV V2 VTI: 23.3 CM
BH CV ECHO MEAS - MVA(P1/2T): 1.66 CM2
BH CV ECHO MEAS - MVA(VTI): 1.9 CM2
BH CV ECHO MEAS - PA ACC TIME: 0.11 SEC
BH CV ECHO MEAS - PA PR(ACCEL): 30.3 MMHG
BH CV ECHO MEAS - PA V2 MAX: 93.7 CM/SEC
BH CV ECHO MEAS - PULM A REVS DUR: 0.11 SEC
BH CV ECHO MEAS - PULM A REVS VEL: 37.5 CM/SEC
BH CV ECHO MEAS - PULM DIAS VEL: 42.7 CM/SEC
BH CV ECHO MEAS - PULM S/D: 0.96
BH CV ECHO MEAS - PULM SYS VEL: 41 CM/SEC
BH CV ECHO MEAS - QP/QS: 1.37
BH CV ECHO MEAS - RV MAX PG: 2.25 MMHG
BH CV ECHO MEAS - RV V1 MAX: 75 CM/SEC
BH CV ECHO MEAS - RV V1 VTI: 14.3 CM
BH CV ECHO MEAS - RVOT DIAM: 2.32 CM
BH CV ECHO MEAS - SV(LVOT): 44.2 ML
BH CV ECHO MEAS - SV(MOD-SP2): 45 ML
BH CV ECHO MEAS - SV(MOD-SP4): 56 ML
BH CV ECHO MEAS - SV(RVOT): 60.6 ML
BH CV ECHO MEAS - TAPSE (>1.6): 1.8 CM
BH CV ECHO MEASUREMENTS AVERAGE E/E' RATIO: 7.33
BH CV XLRA - RV BASE: 3.9 CM
BH CV XLRA - RV LENGTH: 6.3 CM
BH CV XLRA - RV MID: 3.4 CM
BH CV XLRA - TDI S': 9.8 CM/SEC
LEFT ATRIUM VOLUME INDEX: 27.4 ML/M2
MAXIMAL PREDICTED HEART RATE: 152 BPM
STRESS TARGET HR: 129 BPM

## 2022-07-15 PROCEDURE — 93306 TTE W/DOPPLER COMPLETE: CPT

## 2022-07-15 PROCEDURE — 93306 TTE W/DOPPLER COMPLETE: CPT | Performed by: INTERNAL MEDICINE

## 2022-07-16 DIAGNOSIS — Z95.2 HX OF AORTIC VALVE REPLACEMENT, MECHANICAL: ICD-10-CM

## 2022-07-18 RX ORDER — WARFARIN SODIUM 2.5 MG/1
TABLET ORAL
Qty: 30 TABLET | Refills: 5 | Status: SHIPPED | OUTPATIENT
Start: 2022-07-18

## 2022-08-04 ENCOUNTER — CLINICAL SUPPORT (OUTPATIENT)
Dept: CARDIOLOGY | Facility: CLINIC | Age: 67
End: 2022-08-04

## 2022-08-04 DIAGNOSIS — Z79.01 LONG TERM (CURRENT) USE OF ANTICOAGULANTS: ICD-10-CM

## 2022-08-04 DIAGNOSIS — Z95.2 HX OF AORTIC VALVE REPLACEMENT, MECHANICAL: Primary | ICD-10-CM

## 2022-08-04 LAB — INR PPP: 3.2 (ref 0.9–1.1)

## 2022-08-04 PROCEDURE — 85610 PROTHROMBIN TIME: CPT | Performed by: INTERNAL MEDICINE

## 2022-08-04 PROCEDURE — 36416 COLLJ CAPILLARY BLOOD SPEC: CPT | Performed by: INTERNAL MEDICINE

## 2022-09-01 DIAGNOSIS — Z95.2 HX OF AORTIC VALVE REPLACEMENT, MECHANICAL: Chronic | ICD-10-CM

## 2022-09-01 RX ORDER — WARFARIN SODIUM 5 MG/1
TABLET ORAL
Qty: 90 TABLET | Refills: 0 | Status: SHIPPED | OUTPATIENT
Start: 2022-09-01

## 2022-09-02 ENCOUNTER — CLINICAL SUPPORT (OUTPATIENT)
Dept: CARDIOLOGY | Facility: CLINIC | Age: 67
End: 2022-09-02

## 2022-09-02 DIAGNOSIS — Z79.01 LONG TERM (CURRENT) USE OF ANTICOAGULANTS: ICD-10-CM

## 2022-09-02 DIAGNOSIS — Z95.2 HX OF AORTIC VALVE REPLACEMENT, MECHANICAL: Primary | ICD-10-CM

## 2022-09-02 LAB — INR PPP: 4.3 (ref 0.9–1.1)

## 2022-09-02 PROCEDURE — 36416 COLLJ CAPILLARY BLOOD SPEC: CPT | Performed by: INTERNAL MEDICINE

## 2022-09-02 PROCEDURE — 85610 PROTHROMBIN TIME: CPT | Performed by: INTERNAL MEDICINE

## 2022-09-12 ENCOUNTER — ANTICOAGULATION VISIT (OUTPATIENT)
Dept: CARDIOLOGY | Facility: CLINIC | Age: 67
End: 2022-09-12

## 2022-09-12 DIAGNOSIS — Z79.01 LONG TERM (CURRENT) USE OF ANTICOAGULANTS: ICD-10-CM

## 2022-09-12 DIAGNOSIS — Z95.2 HX OF AORTIC VALVE REPLACEMENT, MECHANICAL: Primary | ICD-10-CM

## 2022-09-12 LAB — INR PPP: 4.5

## 2022-09-22 ENCOUNTER — CLINICAL SUPPORT (OUTPATIENT)
Dept: CARDIOLOGY | Facility: CLINIC | Age: 67
End: 2022-09-22

## 2022-09-22 DIAGNOSIS — Z95.2 HX OF AORTIC VALVE REPLACEMENT, MECHANICAL: Primary | ICD-10-CM

## 2022-09-22 DIAGNOSIS — Z79.01 LONG TERM (CURRENT) USE OF ANTICOAGULANTS: ICD-10-CM

## 2022-09-22 LAB — INR PPP: 2.4 (ref 0.9–1.1)

## 2022-09-22 PROCEDURE — 85610 PROTHROMBIN TIME: CPT | Performed by: INTERNAL MEDICINE

## 2022-09-22 PROCEDURE — 36416 COLLJ CAPILLARY BLOOD SPEC: CPT | Performed by: INTERNAL MEDICINE

## 2022-09-27 DIAGNOSIS — E78.5 DYSLIPIDEMIA: Chronic | ICD-10-CM

## 2022-09-27 RX ORDER — FENOFIBRATE 160 MG/1
TABLET ORAL
Qty: 90 TABLET | Refills: 3 | Status: SHIPPED | OUTPATIENT
Start: 2022-09-27

## 2022-09-28 ENCOUNTER — CLINICAL SUPPORT (OUTPATIENT)
Dept: CARDIOLOGY | Facility: CLINIC | Age: 67
End: 2022-09-28

## 2022-09-28 DIAGNOSIS — Z79.01 LONG TERM (CURRENT) USE OF ANTICOAGULANTS: ICD-10-CM

## 2022-09-28 DIAGNOSIS — Z95.2 HX OF AORTIC VALVE REPLACEMENT, MECHANICAL: Primary | ICD-10-CM

## 2022-09-28 LAB — INR PPP: 3 (ref 2.5–3.5)

## 2022-09-28 PROCEDURE — 85610 PROTHROMBIN TIME: CPT | Performed by: INTERNAL MEDICINE

## 2022-09-28 PROCEDURE — 36416 COLLJ CAPILLARY BLOOD SPEC: CPT | Performed by: INTERNAL MEDICINE

## 2022-10-12 ENCOUNTER — CLINICAL SUPPORT (OUTPATIENT)
Dept: CARDIOLOGY | Facility: CLINIC | Age: 67
End: 2022-10-12

## 2022-10-12 DIAGNOSIS — Z79.01 LONG TERM (CURRENT) USE OF ANTICOAGULANTS: Chronic | ICD-10-CM

## 2022-10-12 DIAGNOSIS — Z95.2 HX OF AORTIC VALVE REPLACEMENT, MECHANICAL: Primary | Chronic | ICD-10-CM

## 2022-10-12 LAB — INR PPP: 3.5 (ref 0.9–1.1)

## 2022-10-12 PROCEDURE — 36416 COLLJ CAPILLARY BLOOD SPEC: CPT | Performed by: INTERNAL MEDICINE

## 2022-10-12 PROCEDURE — 85610 PROTHROMBIN TIME: CPT | Performed by: INTERNAL MEDICINE

## 2023-01-05 DIAGNOSIS — I10 ESSENTIAL HYPERTENSION: ICD-10-CM

## 2023-01-06 RX ORDER — IRBESARTAN 75 MG/1
TABLET ORAL
Qty: 90 TABLET | Refills: 1 | Status: SHIPPED | OUTPATIENT
Start: 2023-01-06

## 2024-10-09 ENCOUNTER — TELEPHONE (OUTPATIENT)
Dept: NEUROLOGY | Facility: CLINIC | Age: 69
End: 2024-10-09
Payer: MEDICARE

## 2025-02-20 ENCOUNTER — TRANSCRIBE ORDERS (OUTPATIENT)
Dept: ADMINISTRATIVE | Facility: HOSPITAL | Age: 70
End: 2025-02-20
Payer: MEDICARE

## 2025-02-20 DIAGNOSIS — I65.22 OCCLUSION OF LEFT CAROTID ARTERY: Primary | ICD-10-CM

## 2025-03-13 ENCOUNTER — HOSPITAL ENCOUNTER (OUTPATIENT)
Facility: HOSPITAL | Age: 70
Discharge: HOME | End: 2025-03-13
Admitting: INTERNAL MEDICINE
Payer: MEDICARE

## 2025-03-13 DIAGNOSIS — I65.22 OCCLUSION OF LEFT CAROTID ARTERY: ICD-10-CM

## 2025-03-13 PROCEDURE — 70498 CT ANGIOGRAPHY NECK: CPT

## 2025-03-13 PROCEDURE — 25510000001 IOPAMIDOL PER 1 ML: Performed by: INTERNAL MEDICINE

## 2025-03-13 PROCEDURE — 70496 CT ANGIOGRAPHY HEAD: CPT

## 2025-03-13 RX ORDER — IOPAMIDOL 755 MG/ML
100 INJECTION, SOLUTION INTRAVASCULAR
Status: COMPLETED | OUTPATIENT
Start: 2025-03-13 | End: 2025-03-13

## 2025-03-13 RX ADMIN — IOPAMIDOL 95 ML: 755 INJECTION, SOLUTION INTRAVENOUS at 09:46

## (undated) DEVICE — CATH DIAG IMPULSE FL3.5 5F 100CM

## (undated) DEVICE — GW EMR FIX EXCHG J STD .035 3MM 260CM

## (undated) DEVICE — CATH DIAG IMPULSE FR4 5F 100CM

## (undated) DEVICE — GLIDESHEATH SLENDER STAINLESS STEEL KIT: Brand: GLIDESHEATH SLENDER

## (undated) DEVICE — PK CATH CARD 40

## (undated) DEVICE — KT MANIFLD CARDIAC

## (undated) DEVICE — TR BAND RADIAL ARTERY COMPRESSION DEVICE: Brand: TR BAND